# Patient Record
Sex: FEMALE | Employment: UNEMPLOYED | URBAN - METROPOLITAN AREA
[De-identification: names, ages, dates, MRNs, and addresses within clinical notes are randomized per-mention and may not be internally consistent; named-entity substitution may affect disease eponyms.]

---
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
## 2021-01-08 DIAGNOSIS — N91.2 AMENORRHEA: Primary | ICD-10-CM

## 2021-01-26 ENCOUNTER — HOSPITAL ENCOUNTER (OUTPATIENT)
Dept: ULTRASOUND IMAGING | Facility: CLINIC | Age: 21
Discharge: HOME | End: 2021-01-26
Attending: ADVANCED PRACTICE MIDWIFE | Admitting: ADVANCED PRACTICE MIDWIFE
Payer: MEDICAID

## 2021-01-26 DIAGNOSIS — N91.2 AMENORRHEA: ICD-10-CM

## 2021-01-26 PROCEDURE — 76801 OB US < 14 WKS SINGLE FETUS: CPT | Mod: 26 | Performed by: RADIOLOGY

## 2021-01-26 PROCEDURE — 76801 OB US < 14 WKS SINGLE FETUS: CPT

## 2021-03-19 DIAGNOSIS — Z36.89 ENCOUNTER FOR FETAL ANATOMIC SURVEY: Primary | ICD-10-CM

## 2021-03-22 ENCOUNTER — TRANSCRIBE ORDERS (OUTPATIENT)
Dept: MATERNAL FETAL MEDICINE | Facility: CLINIC | Age: 21
End: 2021-03-22

## 2021-03-22 DIAGNOSIS — O26.90 PREGNANCY RELATED CONDITION, ANTEPARTUM: Primary | ICD-10-CM

## 2021-04-14 ENCOUNTER — PRE VISIT (OUTPATIENT)
Dept: MATERNAL FETAL MEDICINE | Facility: CLINIC | Age: 21
End: 2021-04-14

## 2021-04-15 ENCOUNTER — OFFICE VISIT (OUTPATIENT)
Dept: MATERNAL FETAL MEDICINE | Facility: CLINIC | Age: 21
End: 2021-04-15
Attending: ADVANCED PRACTICE MIDWIFE
Payer: COMMERCIAL

## 2021-04-15 ENCOUNTER — HOSPITAL ENCOUNTER (OUTPATIENT)
Dept: ULTRASOUND IMAGING | Facility: CLINIC | Age: 21
End: 2021-04-15
Attending: ADVANCED PRACTICE MIDWIFE
Payer: COMMERCIAL

## 2021-04-15 DIAGNOSIS — O35.9XX0 SUSPECTED FETAL ANOMALY, ANTEPARTUM, SINGLE OR UNSPECIFIED FETUS: Primary | ICD-10-CM

## 2021-04-15 DIAGNOSIS — O26.90 PREGNANCY RELATED CONDITION, ANTEPARTUM: ICD-10-CM

## 2021-04-15 PROCEDURE — 76805 OB US >/= 14 WKS SNGL FETUS: CPT

## 2021-04-15 PROCEDURE — 76805 OB US >/= 14 WKS SNGL FETUS: CPT | Mod: 26 | Performed by: OBSTETRICS & GYNECOLOGY

## 2021-04-15 NOTE — PROGRESS NOTES
Please see ultrasound report under Imaging tab for details of today's ultrasound.    Anitra Bernal MD  Maternal-Fetal Medicine

## 2021-05-13 ENCOUNTER — HOSPITAL ENCOUNTER (OUTPATIENT)
Dept: ULTRASOUND IMAGING | Facility: CLINIC | Age: 21
End: 2021-05-13
Attending: OBSTETRICS & GYNECOLOGY
Payer: COMMERCIAL

## 2021-05-13 ENCOUNTER — OFFICE VISIT (OUTPATIENT)
Dept: MATERNAL FETAL MEDICINE | Facility: CLINIC | Age: 21
End: 2021-05-13
Attending: OBSTETRICS & GYNECOLOGY
Payer: COMMERCIAL

## 2021-05-13 DIAGNOSIS — O35.9XX0 SUSPECTED FETAL ANOMALY, ANTEPARTUM, SINGLE OR UNSPECIFIED FETUS: ICD-10-CM

## 2021-05-13 PROCEDURE — 76816 OB US FOLLOW-UP PER FETUS: CPT | Mod: 26 | Performed by: OBSTETRICS & GYNECOLOGY

## 2021-05-13 PROCEDURE — 76816 OB US FOLLOW-UP PER FETUS: CPT

## 2021-05-13 NOTE — PROGRESS NOTES
"Please see \"Imaging\" tab under \"Chart Review\" for details of today's US at the [facility].    Kristofer Cervantes MD  Maternal-Fetal Medicine      "

## 2021-06-19 ENCOUNTER — HEALTH MAINTENANCE LETTER (OUTPATIENT)
Age: 21
End: 2021-06-19

## 2021-08-14 ENCOUNTER — TELEPHONE (OUTPATIENT)
Dept: OBGYN | Facility: CLINIC | Age: 21
End: 2021-08-14

## 2021-08-15 NOTE — TELEPHONE ENCOUNTER
at 36w0d feeling increased fetal movement. No contractions, but has had some cramps. No vaginal bleeding, no leaking of fluid, no fever, no cough, +nauseated over last few days, no vomiting, no diarrhea.     No warning sign symptoms at this time. Reviewed signs of labor and to call if fetal movement decreases. Questions answered.    Brigitte Sethi MD, MSCI    [facility]/OBGYN

## 2021-08-18 ENCOUNTER — LAB REQUISITION (OUTPATIENT)
Dept: LAB | Facility: CLINIC | Age: 21
End: 2021-08-18
Payer: COMMERCIAL

## 2021-08-18 DIAGNOSIS — O09.90 SUPERVISION OF HIGH RISK PREGNANCY, UNSPECIFIED, UNSPECIFIED TRIMESTER: ICD-10-CM

## 2021-08-18 PROCEDURE — 87653 STREP B DNA AMP PROBE: CPT | Mod: ORL | Performed by: ADVANCED PRACTICE MIDWIFE

## 2021-08-18 PROCEDURE — 87491 CHLMYD TRACH DNA AMP PROBE: CPT | Mod: ORL | Performed by: ADVANCED PRACTICE MIDWIFE

## 2021-08-18 PROCEDURE — 87591 N.GONORRHOEAE DNA AMP PROB: CPT | Mod: ORL | Performed by: ADVANCED PRACTICE MIDWIFE

## 2021-08-19 LAB
C TRACH DNA SPEC QL NAA+PROBE: NEGATIVE
GP B STREP DNA SPEC QL NAA+PROBE: NEGATIVE
N GONORRHOEA DNA SPEC QL NAA+PROBE: NEGATIVE
PATIENT PENICILLIN, AMOXICILLIN, CEPHALOSPORINS ALLERGY: NORMAL

## 2021-08-28 ENCOUNTER — HOSPITAL ENCOUNTER (OUTPATIENT)
Facility: CLINIC | Age: 21
End: 2021-08-28
Attending: ADVANCED PRACTICE MIDWIFE | Admitting: ADVANCED PRACTICE MIDWIFE
Payer: COMMERCIAL

## 2021-08-28 ENCOUNTER — HOSPITAL ENCOUNTER (OUTPATIENT)
Facility: CLINIC | Age: 21
Discharge: HOME | End: 2021-08-28
Attending: REGISTERED NURSE | Admitting: REGISTERED NURSE
Payer: COMMERCIAL

## 2021-08-28 VITALS — TEMPERATURE: 98.8 F | DIASTOLIC BLOOD PRESSURE: 74 MMHG | SYSTOLIC BLOOD PRESSURE: 122 MMHG | RESPIRATION RATE: 18 BRPM

## 2021-08-28 PROBLEM — Z71.89 COORDINATION OF COMPLEX CARE: Status: ACTIVE | Noted: 2021-03-09

## 2021-08-28 PROBLEM — Z36.89 ENCOUNTER FOR TRIAGE IN PREGNANT PATIENT: Status: ACTIVE | Noted: 2021-08-28

## 2021-08-28 PROBLEM — Z78.9 NOT IMMUNE TO HEPATITIS B VIRUS: Status: ACTIVE | Noted: 2021-07-12

## 2021-08-28 PROBLEM — Z86.59 HISTORY OF RECENT STRESSFUL LIFE EVENT: Status: ACTIVE | Noted: 2017-08-14

## 2021-08-28 PROBLEM — G44.52 NEW DAILY PERSISTENT HEADACHE: Status: ACTIVE | Noted: 2019-07-18

## 2021-08-28 PROBLEM — O09.90 SUPERVISION OF HIGH RISK PREGNANCY, ANTEPARTUM: Status: ACTIVE | Noted: 2021-03-19

## 2021-08-28 LAB
ALT SERPL W P-5'-P-CCNC: 13 U/L (ref 0–50)
AST SERPL W P-5'-P-CCNC: 15 U/L (ref 0–45)
CREAT SERPL-MCNC: 0.79 MG/DL (ref 0.52–1.04)
CREAT UR-MCNC: 119 MG/DL
ERYTHROCYTE [DISTWIDTH] IN BLOOD BY AUTOMATED COUNT: 13.2 % (ref 10–15)
GFR SERPL CREATININE-BSD FRML MDRD: >90 ML/MIN/1.73M2
HCT VFR BLD AUTO: 31.2 % (ref 35–47)
HGB BLD-MCNC: 10.2 G/DL (ref 11.7–15.7)
MCH RBC QN AUTO: 29.1 PG (ref 26.5–33)
MCHC RBC AUTO-ENTMCNC: 32.7 G/DL (ref 31.5–36.5)
MCV RBC AUTO: 89 FL (ref 78–100)
PLATELET # BLD AUTO: 224 10E3/UL (ref 150–450)
PROT UR-MCNC: 0.18 G/L
PROT/CREAT 24H UR: 0.15 G/G CR (ref 0–0.2)
RBC # BLD AUTO: 3.5 10E6/UL (ref 3.8–5.2)
URATE SERPL-MCNC: 4.6 MG/DL (ref 2.6–6)
WBC # BLD AUTO: 12.1 10E3/UL (ref 4–11)

## 2021-08-28 PROCEDURE — 84550 ASSAY OF BLOOD/URIC ACID: CPT | Performed by: ADVANCED PRACTICE MIDWIFE

## 2021-08-28 PROCEDURE — 999N000104 HC STATISTIC NO CHARGE: Performed by: EMERGENCY MEDICINE

## 2021-08-28 PROCEDURE — 85460 HEMOGLOBIN FETAL: CPT | Performed by: ADVANCED PRACTICE MIDWIFE

## 2021-08-28 PROCEDURE — G0463 HOSPITAL OUTPT CLINIC VISIT: HCPCS | Mod: 25

## 2021-08-28 PROCEDURE — 36415 COLL VENOUS BLD VENIPUNCTURE: CPT | Performed by: ADVANCED PRACTICE MIDWIFE

## 2021-08-28 PROCEDURE — 84460 ALANINE AMINO (ALT) (SGPT): CPT | Performed by: ADVANCED PRACTICE MIDWIFE

## 2021-08-28 PROCEDURE — 84156 ASSAY OF PROTEIN URINE: CPT | Performed by: ADVANCED PRACTICE MIDWIFE

## 2021-08-28 PROCEDURE — 59025 FETAL NON-STRESS TEST: CPT

## 2021-08-28 PROCEDURE — 82565 ASSAY OF CREATININE: CPT | Performed by: ADVANCED PRACTICE MIDWIFE

## 2021-08-28 PROCEDURE — 84450 TRANSFERASE (AST) (SGOT): CPT | Performed by: ADVANCED PRACTICE MIDWIFE

## 2021-08-28 PROCEDURE — 85027 COMPLETE CBC AUTOMATED: CPT | Performed by: ADVANCED PRACTICE MIDWIFE

## 2021-08-28 PROCEDURE — 250N000013 HC RX MED GY IP 250 OP 250 PS 637: Performed by: REGISTERED NURSE

## 2021-08-28 PROCEDURE — 59025 FETAL NON-STRESS TEST: CPT | Mod: 26 | Performed by: REGISTERED NURSE

## 2021-08-28 PROCEDURE — 99214 OFFICE O/P EST MOD 30 MIN: CPT | Mod: 25 | Performed by: REGISTERED NURSE

## 2021-08-28 RX ORDER — PROCHLORPERAZINE MALEATE 10 MG
10 TABLET ORAL EVERY 6 HOURS PRN
Status: DISCONTINUED | OUTPATIENT
Start: 2021-08-28 | End: 2021-08-29 | Stop reason: HOSPADM

## 2021-08-28 RX ORDER — PRENATAL VIT/IRON FUM/FOLIC AC 27MG-0.8MG
1 TABLET ORAL DAILY
COMMUNITY

## 2021-08-28 RX ORDER — MULTIVIT WITH MINERALS/LUTEIN
TABLET ORAL DAILY
Status: ON HOLD | COMMUNITY
End: 2021-09-07

## 2021-08-28 RX ORDER — METOCLOPRAMIDE HYDROCHLORIDE 5 MG/ML
10 INJECTION INTRAMUSCULAR; INTRAVENOUS EVERY 6 HOURS PRN
Status: DISCONTINUED | OUTPATIENT
Start: 2021-08-28 | End: 2021-08-29 | Stop reason: HOSPADM

## 2021-08-28 RX ORDER — ONDANSETRON 2 MG/ML
4 INJECTION INTRAMUSCULAR; INTRAVENOUS EVERY 6 HOURS PRN
Status: DISCONTINUED | OUTPATIENT
Start: 2021-08-28 | End: 2021-08-29 | Stop reason: HOSPADM

## 2021-08-28 RX ORDER — METOCLOPRAMIDE 10 MG/1
10 TABLET ORAL EVERY 6 HOURS PRN
Status: DISCONTINUED | OUTPATIENT
Start: 2021-08-28 | End: 2021-08-29 | Stop reason: HOSPADM

## 2021-08-28 RX ORDER — ASPIRIN 81 MG/1
81 TABLET, CHEWABLE ORAL DAILY
Status: ON HOLD | COMMUNITY
End: 2021-09-07

## 2021-08-28 RX ORDER — ONDANSETRON 4 MG/1
4 TABLET, ORALLY DISINTEGRATING ORAL EVERY 6 HOURS PRN
Status: DISCONTINUED | OUTPATIENT
Start: 2021-08-28 | End: 2021-08-29 | Stop reason: HOSPADM

## 2021-08-28 RX ORDER — ACETAMINOPHEN 325 MG/1
975 TABLET ORAL EVERY 6 HOURS PRN
Status: DISCONTINUED | OUTPATIENT
Start: 2021-08-28 | End: 2021-08-29 | Stop reason: HOSPADM

## 2021-08-28 RX ORDER — VITAMIN B COMPLEX
1 TABLET ORAL DAILY
Status: ON HOLD | COMMUNITY
End: 2021-09-07

## 2021-08-28 RX ORDER — PROCHLORPERAZINE 25 MG
25 SUPPOSITORY, RECTAL RECTAL EVERY 12 HOURS PRN
Status: DISCONTINUED | OUTPATIENT
Start: 2021-08-28 | End: 2021-08-29 | Stop reason: HOSPADM

## 2021-08-28 RX ADMIN — ACETAMINOPHEN 975 MG: 325 TABLET, FILM COATED ORAL at 22:25

## 2021-08-29 LAB
FETAL RBC % LFV: 0 %
FETAL RBC (ML): 0 ML
IF INDICATED RECOMMENDED DOSE OF RH IMMUNE GLOBULIN UG: 300 UG

## 2021-08-29 NOTE — ED NOTES
Pt arrived to ED with complaint of fall due to tripping, here for baby monitoring .  Pt reports 38 weeks pregnant.   Pt is having contractions No.   Pt feels urge to push No.   Pt reports water broke No.   Report was called and pt was transferred to L&D Yes.

## 2021-08-29 NOTE — DISCHARGE INSTRUCTIONS
Discharge Instruction for Undelivered Patients      You were seen for: Fetal Assessment and fall, Pre-e eval  We Consulted: Bonita Bhatti CNM  You had (Test or Medicine):EFM, Pre-e labs, KB      Diet:   Drink 8 to 12 glasses of liquids (milk, juice, water) every day.  You may eat meals and snacks.     Activity:  Count fetal kicks everyday (see handout)  Call your doctor or nurse midwife if your baby is moving less than usual.     Call your provider if you notice:  Swelling in your face or increased swelling in your hands or legs.  Headaches that are not relieved by Tylenol (acetaminophen).  Changes in your vision (blurring: seeing spots or stars.)  Nausea (sick to your stomach) and vomiting (throwing up).   Weight gain of 5 pounds or more per week.  Heartburn that doesn't go away.  Signs of bladder infection: pain when you urinate (use the toilet), need to go more often and more urgently.  The bag of lerma (rupture of membranes) breaks, or you notice leaking in your underwear.  Bright red blood in your underwear.  Abdominal (lower belly) or stomach pain.  For first baby: Contractions (tightening) less than 5 minutes apart for one hour or more.  Increase or change in vaginal discharge (note the color and amount)      Follow-up:  As scheduled in the clinic

## 2021-08-29 NOTE — PLAN OF CARE
Data: [facility] at 2120.   Reason for maternal/fetal assessment per patient is Headache (fell at 1900, headache 3 days with eye pain)  . Patient is a . Prenatal record reviewed.      OB History    Para Term  AB Living   2 0 0 0 1 0   SAB TAB Ectopic Multiple Live Births   1 0 0 0 0      # Outcome Date GA Lbr Hollis/2nd Weight Sex Delivery Anes PTL Lv   2 Current            1 SAB               Medical History:   Past Medical History:   Diagnosis Date     UTI (urinary tract infection)    . Gestational Age 38w0d. VSS. Cervix: not examined.  Fetal movement present. Patient denies cramping, backache, vaginal discharge, pelvic pressure, UTI symptoms, GI problems, bloody show, vaginal bleeding, edema, epigastric or URQ pain, abdominal pain, rupture of membranes. Admits headache, burning/pressure around eyes, and fall to right knee at 1900. Support person Abraham, present.  Action: Verbal consent for EFM. Triage assessment completed. EFM  And uterine assessment completed. Fetal assessment: Presumed adequate fetal oxygenation documented (see flow record). Patient education pamphlets given on fetal movement counts. Patient instructed to report change in fetal movement, vaginal leaking of fluid or bleeding, abdominal pain, or any concerns related to the pregnancy to her nurse/physician.   Response: Bonita Mckeon CNM informed of patient arrival, headache and fall. Plan per provider is run Pre-e labs, KB, check multiple BPs, give Tylenol and discharge home following reassuring BPs and fetal assessment. Provider will contact patient if labs abnormal.  Patient verbalized understanding of education and verbalized agreement with plan. Discharged ambulatory at 2318.

## 2021-08-29 NOTE — PROGRESS NOTES
"HOSPITAL TRIAGE NOTE  ===================    CHIEF COMPLAINT  ========================  Catalina Hampton is a 21 year old patient presenting today at 38w0d for evaluation of decreased fetal movement, fall/trauma, HA x3 hours.    Patient's last menstrual period was 2020.  Estimated Date of Delivery: Sep 11, 2021       HPI  ==================   Patient reports HA that started 3 days ago with no blurry vision. She feels the headache is \"in the front of [her] head and behind [her] eyes\". She also reports she had an episode of dizziness that caused her to fall onto her knees but doesn't remember hitting her abdomen. This was at 1900 tonight. Denies vaginal bleeding or LOF. Reports felt decreased fetal movement since earlier today but has felt movement since arrival to triage.    Denies fever, cough, SOB or chest pain. Denies having contact with anyone who is Covid-19 positive.   Prenatal record and labs reviewed from [facility], through [facility]MR and Care Everywhere.    CONTRACTIONS: not felt by patient  ABDOMINAL PAIN: none  FETAL MOVEMENT: decreased since today    VAGINAL BLEEDING: none  RUPTURE OF MEMBRANES: no  PELVIC PAIN: none    PREGNANCY COMPLICATIONS: Hep B nonimmune  OTHER: history of childhood trauma    # Pain Assessment:  Current Pain Score 2018   Pain score (0-10) 0   Catalina giraldo pain level was assessed and she currently denies pain.        REVIEW OF SYSTEMS  =====================  C: NEGATIVE for fever, chills  I: NEGATIVE for worrisome rashes, moles or lesions  E: NEGATIVE for vision changes or irritation  R: NEGATIVE for significant cough or SOB  CV: NEGATIVE for chest pain, palpitations or varicosities  GI: NEGATIVE for nausea, abdominal pain, heartburn, or change in bowel habits  : NEGATIVE for frequency, dysuria, or hematuria  M: NEGATIVE for significant arthralgias or myalgia  N: NEGATIVE for headache, weakness, dizziness or paresthesias  P: NEGATIVE for changes in mood or " affect    PROBLEM LIST  ===============  Patient Active Problem List    Diagnosis Date Noted     Encounter for triage in pregnant patient 2021     Priority: Medium       HISTORIES  ==============  ALLERGIES:    No Known Allergies  PAST MEDICAL HISTORY  Past Medical History:   Diagnosis Date     UTI (urinary tract infection)      SOCIAL HISTORY  Social History     Socioeconomic History     Marital status: Single     Spouse name: Not on file     Number of children: Not on file     Years of education: Not on file     Highest education level: Not on file   Occupational History     Not on file   Tobacco Use     Smoking status: Never Smoker     Smokeless tobacco: Never Used   Substance and Sexual Activity     Alcohol use: No     Drug use: No     Sexual activity: Not on file   Other Topics Concern     Not on file   Social History Narrative     Not on file     Social Determinants of Health     Financial Resource Strain:      Difficulty of Paying Living Expenses:    Food Insecurity:      Worried About Running Out of Food in the Last Year:      Ran Out of Food in the Last Year:    Transportation Needs:      Lack of Transportation (Medical):      Lack of Transportation (Non-Medical):    Physical Activity:      Days of Exercise per Week:      Minutes of Exercise per Session:    Stress:      Feeling of Stress :    Social Connections:      Frequency of Communication with Friends and Family:      Frequency of Social Gatherings with Friends and Family:      Attends Judaism Services:      Active Member of Clubs or Organizations:      Attends Club or Organization Meetings:      Marital Status:    Intimate Partner Violence:      Fear of Current or Ex-Partner:      Emotionally Abused:      Physically Abused:      Sexually Abused:      PARTNER: Abraham  FAMILY HISTORY  History reviewed. No pertinent family history.  OB HISTORY  OB History    Para Term  AB Living   2 0 0 0 1 0   SAB TAB Ectopic Multiple Live Births    1 0 0 0 0      # Outcome Date GA Lbr Hollis/2nd Weight Sex Delivery Anes PTL Lv   2 Current            1 SAB              Prenatal Labs:   Lab Results   Component Value Date    HGB 12.6 09/02/2017     Rubella- immune  GBS negative 8/18/21  GCT- passed 6/23/21 2/19/21  Hep B surface antigen- neg  HIV- neg  Hep B nonimmune 3rd dose due 10/13/21      8/18/21  GC/CT negative   Plts 223  Hgb 10.8    ULTRASOUND(s) reviewed: none    EXAM  ============  LMP 12/05/2020   GENERAL APPEARANCE: healthy, alert and no distress  RESP: lungs clear to auscultation - no rales, rhonchi or wheezes  CV: regular rates and rhythm, normal S1 S2, no S3 or S4 and no murmur,and no varicosities  ABDOMEN:  soft, nontender, no epigastric pain  SKIN: no suspicious lesions or rashes  NEURO: Denies headache, blurred vision, other vision changes  PSYCH: mentation appears normal. and affect normal/bright  MS/ LEGS: Reflexes normal bilaterally, No clonus bilaterally, 2+ edema and Negative Becky's     CONTRACTIONS: every irregular minutes and not felt by patient   FETAL HEART TONES: continuous EFM- baseline 150 with moderate variability and positive accelerations. No decelerations.  NST: REACTIVE  EFW:6.5lbs    PELVIC EXAM: deferred  TIJERINA SCORE: n/a  PRESENTATION: VERTEX via BSUS  BLOOD: no  DISCHARGE: none    ROM: no  ROMPLUS: not done    LABS: HELLP labs- ALT, AST, creatinine, uric acid, CBC with platelets, Protein/Creatinine Ratio, and KB  Lab results reviewed- TPCr 0.18    DIAGNOSIS  ============  38w0d seen on the [facility] Triage for decreased fetal movement, HA x3 days and for evaluation after fall/trauma     NST: REACTIVE  Fetal Heart Tones:category one  Patient Active Problem List   Diagnosis     Encounter for triage in pregnant patient       PLAN  ============  -Continuous EFM until 2300 which is 4 hours post-fall.   -Discussed comfort measures for HA, encouraged PRN tylenol. Took 975mg here in triage.   -Encouraged increasing water  intake.   Discharge to home with labor instuctions per discharge instruction form  Call or return to the [facility] with contractions, cramping, abdominal or pelvic pain, vaginal bleeding, leaking fluid or decreased fetal movement.  Follow up at your next clinic visit- 9/1/21 or sooner prn    NAOMI Chavarria CNM

## 2021-09-07 ENCOUNTER — ANESTHESIA (OUTPATIENT)
Dept: OBGYN | Facility: CLINIC | Age: 21
End: 2021-09-07
Payer: COMMERCIAL

## 2021-09-07 ENCOUNTER — HOSPITAL ENCOUNTER (INPATIENT)
Facility: CLINIC | Age: 21
LOS: 2 days | Discharge: HOME HEALTH | End: 2021-09-09
Attending: ADVANCED PRACTICE MIDWIFE | Admitting: ADVANCED PRACTICE MIDWIFE
Payer: COMMERCIAL

## 2021-09-07 ENCOUNTER — ANESTHESIA EVENT (OUTPATIENT)
Dept: OBGYN | Facility: CLINIC | Age: 21
End: 2021-09-07
Payer: COMMERCIAL

## 2021-09-07 DIAGNOSIS — F32.A DEPRESSION, UNSPECIFIED DEPRESSION TYPE: ICD-10-CM

## 2021-09-07 LAB
ABO/RH(D): NORMAL
ALT SERPL W P-5'-P-CCNC: 10 U/L (ref 0–50)
ANTIBODY SCREEN: NEGATIVE
AST SERPL W P-5'-P-CCNC: 19 U/L (ref 0–45)
BASOPHILS # BLD AUTO: 0.1 10E3/UL (ref 0–0.2)
BASOPHILS NFR BLD AUTO: 0 %
CREAT SERPL-MCNC: 0.66 MG/DL (ref 0.52–1.04)
CREAT UR-MCNC: 63 MG/DL
EOSINOPHIL # BLD AUTO: 0.1 10E3/UL (ref 0–0.7)
EOSINOPHIL NFR BLD AUTO: 0 %
ERYTHROCYTE [DISTWIDTH] IN BLOOD BY AUTOMATED COUNT: 13.4 % (ref 10–15)
ERYTHROCYTE [DISTWIDTH] IN BLOOD BY AUTOMATED COUNT: 13.5 % (ref 10–15)
GFR SERPL CREATININE-BSD FRML MDRD: >90 ML/MIN/1.73M2
HCT VFR BLD AUTO: 28.5 % (ref 35–47)
HCT VFR BLD AUTO: 32.8 % (ref 35–47)
HGB BLD-MCNC: 10.5 G/DL (ref 11.7–15.7)
HGB BLD-MCNC: 9.5 G/DL (ref 11.7–15.7)
IMM GRANULOCYTES # BLD: 0.1 10E3/UL
IMM GRANULOCYTES NFR BLD: 1 %
LYMPHOCYTES # BLD AUTO: 2 10E3/UL (ref 0.8–5.3)
LYMPHOCYTES NFR BLD AUTO: 16 %
MCH RBC QN AUTO: 28.6 PG (ref 26.5–33)
MCH RBC QN AUTO: 29.2 PG (ref 26.5–33)
MCHC RBC AUTO-ENTMCNC: 32 G/DL (ref 31.5–36.5)
MCHC RBC AUTO-ENTMCNC: 33.3 G/DL (ref 31.5–36.5)
MCV RBC AUTO: 88 FL (ref 78–100)
MCV RBC AUTO: 89 FL (ref 78–100)
MONOCYTES # BLD AUTO: 0.7 10E3/UL (ref 0–1.3)
MONOCYTES NFR BLD AUTO: 6 %
NEUTROPHILS # BLD AUTO: 9.5 10E3/UL (ref 1.6–8.3)
NEUTROPHILS NFR BLD AUTO: 77 %
NRBC # BLD AUTO: 0 10E3/UL
NRBC BLD AUTO-RTO: 0 /100
PLATELET # BLD AUTO: 197 10E3/UL (ref 150–450)
PLATELET # BLD AUTO: 209 10E3/UL (ref 150–450)
PROT UR-MCNC: 0.1 G/L
PROT/CREAT 24H UR: 0.16 G/G CR (ref 0–0.2)
RBC # BLD AUTO: 3.25 10E6/UL (ref 3.8–5.2)
RBC # BLD AUTO: 3.67 10E6/UL (ref 3.8–5.2)
RUPTURE OF FETAL MEMBRANES BY ROM PLUS: POSITIVE
SARS-COV-2 RNA RESP QL NAA+PROBE: NEGATIVE
SPECIMEN EXPIRATION DATE: NORMAL
T PALLIDUM AB SER QL: NONREACTIVE
URATE SERPL-MCNC: 4.1 MG/DL (ref 2.6–6)
WBC # BLD AUTO: 12.5 10E3/UL (ref 4–11)
WBC # BLD AUTO: 16.5 10E3/UL (ref 4–11)

## 2021-09-07 PROCEDURE — 84156 ASSAY OF PROTEIN URINE: CPT | Performed by: ADVANCED PRACTICE MIDWIFE

## 2021-09-07 PROCEDURE — 84112 EVAL AMNIOTIC FLUID PROTEIN: CPT | Performed by: ADVANCED PRACTICE MIDWIFE

## 2021-09-07 PROCEDURE — 84550 ASSAY OF BLOOD/URIC ACID: CPT | Performed by: ADVANCED PRACTICE MIDWIFE

## 2021-09-07 PROCEDURE — 85025 COMPLETE CBC W/AUTO DIFF WBC: CPT | Performed by: ADVANCED PRACTICE MIDWIFE

## 2021-09-07 PROCEDURE — 00HU33Z INSERTION OF INFUSION DEVICE INTO SPINAL CANAL, PERCUTANEOUS APPROACH: ICD-10-PCS | Performed by: ANESTHESIOLOGY

## 2021-09-07 PROCEDURE — 82565 ASSAY OF CREATININE: CPT | Performed by: ADVANCED PRACTICE MIDWIFE

## 2021-09-07 PROCEDURE — 84450 TRANSFERASE (AST) (SGOT): CPT | Performed by: ADVANCED PRACTICE MIDWIFE

## 2021-09-07 PROCEDURE — 250N000013 HC RX MED GY IP 250 OP 250 PS 637: Performed by: ADVANCED PRACTICE MIDWIFE

## 2021-09-07 PROCEDURE — 84460 ALANINE AMINO (ALT) (SGPT): CPT | Performed by: ADVANCED PRACTICE MIDWIFE

## 2021-09-07 PROCEDURE — 250N000011 HC RX IP 250 OP 636: Performed by: ADVANCED PRACTICE MIDWIFE

## 2021-09-07 PROCEDURE — 0KQM0ZZ REPAIR PERINEUM MUSCLE, OPEN APPROACH: ICD-10-PCS | Performed by: ADVANCED PRACTICE MIDWIFE

## 2021-09-07 PROCEDURE — 86780 TREPONEMA PALLIDUM: CPT | Performed by: ADVANCED PRACTICE MIDWIFE

## 2021-09-07 PROCEDURE — 258N000003 HC RX IP 258 OP 636: Performed by: ADVANCED PRACTICE MIDWIFE

## 2021-09-07 PROCEDURE — 250N000009 HC RX 250: Performed by: ADVANCED PRACTICE MIDWIFE

## 2021-09-07 PROCEDURE — 0UJD7ZZ INSPECTION OF UTERUS AND CERVIX, VIA NATURAL OR ARTIFICIAL OPENING: ICD-10-PCS | Performed by: ADVANCED PRACTICE MIDWIFE

## 2021-09-07 PROCEDURE — 250N000011 HC RX IP 250 OP 636

## 2021-09-07 PROCEDURE — 3E0R3BZ INTRODUCTION OF ANESTHETIC AGENT INTO SPINAL CANAL, PERCUTANEOUS APPROACH: ICD-10-PCS | Performed by: ANESTHESIOLOGY

## 2021-09-07 PROCEDURE — 86901 BLOOD TYPING SEROLOGIC RH(D): CPT | Performed by: ADVANCED PRACTICE MIDWIFE

## 2021-09-07 PROCEDURE — 59409 OBSTETRICAL CARE: CPT | Performed by: ADVANCED PRACTICE MIDWIFE

## 2021-09-07 PROCEDURE — 722N000001 HC LABOR CARE VAGINAL DELIVERY SINGLE

## 2021-09-07 PROCEDURE — 258N000003 HC RX IP 258 OP 636

## 2021-09-07 PROCEDURE — 85027 COMPLETE CBC AUTOMATED: CPT | Performed by: ADVANCED PRACTICE MIDWIFE

## 2021-09-07 PROCEDURE — G0463 HOSPITAL OUTPT CLINIC VISIT: HCPCS

## 2021-09-07 PROCEDURE — 36415 COLL VENOUS BLD VENIPUNCTURE: CPT | Performed by: ADVANCED PRACTICE MIDWIFE

## 2021-09-07 PROCEDURE — 120N000002 HC R&B MED SURG/OB UMMC

## 2021-09-07 PROCEDURE — 370N000003 HC ANESTHESIA WARD SERVICE

## 2021-09-07 PROCEDURE — U0003 INFECTIOUS AGENT DETECTION BY NUCLEIC ACID (DNA OR RNA); SEVERE ACUTE RESPIRATORY SYNDROME CORONAVIRUS 2 (SARS-COV-2) (CORONAVIRUS DISEASE [COVID-19]), AMPLIFIED PROBE TECHNIQUE, MAKING USE OF HIGH THROUGHPUT TECHNOLOGIES AS DESCRIBED BY CMS-2020-01-R: HCPCS | Performed by: ADVANCED PRACTICE MIDWIFE

## 2021-09-07 RX ORDER — CARBOPROST TROMETHAMINE 250 UG/ML
250 INJECTION, SOLUTION INTRAMUSCULAR
Status: DISCONTINUED | OUTPATIENT
Start: 2021-09-07 | End: 2021-09-07 | Stop reason: HOSPADM

## 2021-09-07 RX ORDER — IBUPROFEN 600 MG/1
600 TABLET, FILM COATED ORAL EVERY 6 HOURS PRN
Qty: 60 TABLET | Refills: 0 | Status: SHIPPED | OUTPATIENT
Start: 2021-09-07

## 2021-09-07 RX ORDER — MISOPROSTOL 200 UG/1
800 TABLET ORAL
Status: DISCONTINUED | OUTPATIENT
Start: 2021-09-07 | End: 2021-09-07 | Stop reason: HOSPADM

## 2021-09-07 RX ORDER — MISOPROSTOL 200 UG/1
400 TABLET ORAL
Status: DISCONTINUED | OUTPATIENT
Start: 2021-09-07 | End: 2021-09-07 | Stop reason: HOSPADM

## 2021-09-07 RX ORDER — SERTRALINE HYDROCHLORIDE 25 MG/1
25 TABLET, FILM COATED ORAL DAILY
Qty: 60 TABLET | Refills: 1 | Status: SHIPPED | OUTPATIENT
Start: 2021-09-07 | End: 2021-09-07

## 2021-09-07 RX ORDER — NALBUPHINE HYDROCHLORIDE 10 MG/ML
2.5-5 INJECTION, SOLUTION INTRAMUSCULAR; INTRAVENOUS; SUBCUTANEOUS EVERY 6 HOURS PRN
Status: DISCONTINUED | OUTPATIENT
Start: 2021-09-07 | End: 2021-09-09 | Stop reason: HOSPADM

## 2021-09-07 RX ORDER — KETOROLAC TROMETHAMINE 30 MG/ML
30 INJECTION, SOLUTION INTRAMUSCULAR; INTRAVENOUS
Status: DISCONTINUED | OUTPATIENT
Start: 2021-09-07 | End: 2021-09-09 | Stop reason: HOSPADM

## 2021-09-07 RX ORDER — TRANEXAMIC ACID 10 MG/ML
1 INJECTION, SOLUTION INTRAVENOUS EVERY 30 MIN PRN
Status: DISCONTINUED | OUTPATIENT
Start: 2021-09-07 | End: 2021-09-09 | Stop reason: HOSPADM

## 2021-09-07 RX ORDER — SODIUM CHLORIDE, SODIUM LACTATE, POTASSIUM CHLORIDE, CALCIUM CHLORIDE 600; 310; 30; 20 MG/100ML; MG/100ML; MG/100ML; MG/100ML
INJECTION, SOLUTION INTRAVENOUS CONTINUOUS
Status: DISCONTINUED | OUTPATIENT
Start: 2021-09-07 | End: 2021-09-07 | Stop reason: HOSPADM

## 2021-09-07 RX ORDER — NALOXONE HYDROCHLORIDE 0.4 MG/ML
0.4 INJECTION, SOLUTION INTRAMUSCULAR; INTRAVENOUS; SUBCUTANEOUS
Status: DISCONTINUED | OUTPATIENT
Start: 2021-09-07 | End: 2021-09-07 | Stop reason: HOSPADM

## 2021-09-07 RX ORDER — EPHEDRINE SULFATE 50 MG/ML
INJECTION, SOLUTION INTRAMUSCULAR; INTRAVENOUS; SUBCUTANEOUS
Status: DISCONTINUED
Start: 2021-09-07 | End: 2021-09-07 | Stop reason: HOSPADM

## 2021-09-07 RX ORDER — BISACODYL 10 MG
10 SUPPOSITORY, RECTAL RECTAL DAILY PRN
Status: DISCONTINUED | OUTPATIENT
Start: 2021-09-07 | End: 2021-09-09 | Stop reason: HOSPADM

## 2021-09-07 RX ORDER — LIDOCAINE 40 MG/G
CREAM TOPICAL
Status: DISCONTINUED | OUTPATIENT
Start: 2021-09-07 | End: 2021-09-07 | Stop reason: HOSPADM

## 2021-09-07 RX ORDER — OXYTOCIN/0.9 % SODIUM CHLORIDE 30/500 ML
1-24 PLASTIC BAG, INJECTION (ML) INTRAVENOUS CONTINUOUS
Status: DISCONTINUED | OUTPATIENT
Start: 2021-09-07 | End: 2021-09-07 | Stop reason: HOSPADM

## 2021-09-07 RX ORDER — PROCHLORPERAZINE MALEATE 10 MG
10 TABLET ORAL EVERY 6 HOURS PRN
Status: DISCONTINUED | OUTPATIENT
Start: 2021-09-07 | End: 2021-09-07 | Stop reason: HOSPADM

## 2021-09-07 RX ORDER — MISOPROSTOL 200 UG/1
800 TABLET ORAL
Status: DISCONTINUED | OUTPATIENT
Start: 2021-09-07 | End: 2021-09-09 | Stop reason: HOSPADM

## 2021-09-07 RX ORDER — OXYTOCIN/0.9 % SODIUM CHLORIDE 30/500 ML
340 PLASTIC BAG, INJECTION (ML) INTRAVENOUS CONTINUOUS PRN
Status: DISCONTINUED | OUTPATIENT
Start: 2021-09-07 | End: 2021-09-09 | Stop reason: HOSPADM

## 2021-09-07 RX ORDER — METOCLOPRAMIDE 10 MG/1
10 TABLET ORAL EVERY 6 HOURS PRN
Status: DISCONTINUED | OUTPATIENT
Start: 2021-09-07 | End: 2021-09-07 | Stop reason: HOSPADM

## 2021-09-07 RX ORDER — ACETAMINOPHEN 325 MG/1
650 TABLET ORAL EVERY 4 HOURS PRN
Status: DISCONTINUED | OUTPATIENT
Start: 2021-09-07 | End: 2021-09-09 | Stop reason: HOSPADM

## 2021-09-07 RX ORDER — MISOPROSTOL 200 UG/1
TABLET ORAL
Status: DISCONTINUED
Start: 2021-09-07 | End: 2021-09-07 | Stop reason: WASHOUT

## 2021-09-07 RX ORDER — OXYTOCIN 10 [USP'U]/ML
10 INJECTION, SOLUTION INTRAMUSCULAR; INTRAVENOUS
Status: DISCONTINUED | OUTPATIENT
Start: 2021-09-07 | End: 2021-09-09 | Stop reason: HOSPADM

## 2021-09-07 RX ORDER — METOCLOPRAMIDE HYDROCHLORIDE 5 MG/ML
10 INJECTION INTRAMUSCULAR; INTRAVENOUS EVERY 6 HOURS PRN
Status: DISCONTINUED | OUTPATIENT
Start: 2021-09-07 | End: 2021-09-07 | Stop reason: HOSPADM

## 2021-09-07 RX ORDER — OXYTOCIN 10 [USP'U]/ML
INJECTION, SOLUTION INTRAMUSCULAR; INTRAVENOUS
Status: DISCONTINUED
Start: 2021-09-07 | End: 2021-09-07 | Stop reason: WASHOUT

## 2021-09-07 RX ORDER — ONDANSETRON 4 MG/1
4 TABLET, ORALLY DISINTEGRATING ORAL EVERY 6 HOURS PRN
Status: DISCONTINUED | OUTPATIENT
Start: 2021-09-07 | End: 2021-09-07 | Stop reason: HOSPADM

## 2021-09-07 RX ORDER — OXYTOCIN 10 [USP'U]/ML
10 INJECTION, SOLUTION INTRAMUSCULAR; INTRAVENOUS
Status: DISCONTINUED | OUTPATIENT
Start: 2021-09-07 | End: 2021-09-07 | Stop reason: HOSPADM

## 2021-09-07 RX ORDER — NALOXONE HYDROCHLORIDE 0.4 MG/ML
0.2 INJECTION, SOLUTION INTRAMUSCULAR; INTRAVENOUS; SUBCUTANEOUS
Status: DISCONTINUED | OUTPATIENT
Start: 2021-09-07 | End: 2021-09-07 | Stop reason: HOSPADM

## 2021-09-07 RX ORDER — MISOPROSTOL 200 UG/1
400 TABLET ORAL
Status: DISCONTINUED | OUTPATIENT
Start: 2021-09-07 | End: 2021-09-09 | Stop reason: HOSPADM

## 2021-09-07 RX ORDER — OXYTOCIN/0.9 % SODIUM CHLORIDE 30/500 ML
340 PLASTIC BAG, INJECTION (ML) INTRAVENOUS CONTINUOUS PRN
Status: COMPLETED | OUTPATIENT
Start: 2021-09-07 | End: 2021-09-07

## 2021-09-07 RX ORDER — DOCUSATE SODIUM 100 MG/1
100 CAPSULE, LIQUID FILLED ORAL DAILY
Status: DISCONTINUED | OUTPATIENT
Start: 2021-09-08 | End: 2021-09-09 | Stop reason: HOSPADM

## 2021-09-07 RX ORDER — ONDANSETRON 2 MG/ML
4 INJECTION INTRAMUSCULAR; INTRAVENOUS EVERY 6 HOURS PRN
Status: DISCONTINUED | OUTPATIENT
Start: 2021-09-07 | End: 2021-09-07 | Stop reason: HOSPADM

## 2021-09-07 RX ORDER — IBUPROFEN 600 MG/1
600 TABLET, FILM COATED ORAL
Status: DISCONTINUED | OUTPATIENT
Start: 2021-09-07 | End: 2021-09-07

## 2021-09-07 RX ORDER — OXYTOCIN/0.9 % SODIUM CHLORIDE 30/500 ML
100-340 PLASTIC BAG, INJECTION (ML) INTRAVENOUS CONTINUOUS PRN
Status: DISCONTINUED | OUTPATIENT
Start: 2021-09-07 | End: 2021-09-09 | Stop reason: HOSPADM

## 2021-09-07 RX ORDER — ACETAMINOPHEN 325 MG/1
650 TABLET ORAL EVERY 6 HOURS PRN
Qty: 100 TABLET | Refills: 0 | Status: SHIPPED | OUTPATIENT
Start: 2021-09-07

## 2021-09-07 RX ORDER — SERTRALINE HYDROCHLORIDE 25 MG/1
25 TABLET, FILM COATED ORAL DAILY
Status: DISCONTINUED | OUTPATIENT
Start: 2021-09-07 | End: 2021-09-07

## 2021-09-07 RX ORDER — CARBOPROST TROMETHAMINE 250 UG/ML
250 INJECTION, SOLUTION INTRAMUSCULAR
Status: DISCONTINUED | OUTPATIENT
Start: 2021-09-07 | End: 2021-09-09 | Stop reason: HOSPADM

## 2021-09-07 RX ORDER — IBUPROFEN 800 MG/1
800 TABLET, FILM COATED ORAL EVERY 6 HOURS PRN
Status: DISCONTINUED | OUTPATIENT
Start: 2021-09-07 | End: 2021-09-09 | Stop reason: HOSPADM

## 2021-09-07 RX ORDER — TRANEXAMIC ACID 10 MG/ML
1 INJECTION, SOLUTION INTRAVENOUS EVERY 30 MIN PRN
Status: DISCONTINUED | OUTPATIENT
Start: 2021-09-07 | End: 2021-09-07 | Stop reason: HOSPADM

## 2021-09-07 RX ORDER — CEFAZOLIN SODIUM 2 G/100ML
2 INJECTION, SOLUTION INTRAVENOUS ONCE
Status: COMPLETED | OUTPATIENT
Start: 2021-09-07 | End: 2021-09-07

## 2021-09-07 RX ORDER — MODIFIED LANOLIN
OINTMENT (GRAM) TOPICAL
Status: DISCONTINUED | OUTPATIENT
Start: 2021-09-07 | End: 2021-09-09 | Stop reason: HOSPADM

## 2021-09-07 RX ORDER — FENTANYL CITRATE-0.9 % NACL/PF 10 MCG/ML
100 PLASTIC BAG, INJECTION (ML) INTRAVENOUS EVERY 5 MIN PRN
Status: DISCONTINUED | OUTPATIENT
Start: 2021-09-07 | End: 2021-09-07 | Stop reason: HOSPADM

## 2021-09-07 RX ORDER — FENTANYL/BUPIVACAINE/NS/PF 2-1250MCG
PLASTIC BAG, INJECTION (ML) INJECTION
Status: COMPLETED
Start: 2021-09-07 | End: 2021-09-07

## 2021-09-07 RX ORDER — SODIUM CHLORIDE, SODIUM LACTATE, POTASSIUM CHLORIDE, CALCIUM CHLORIDE 600; 310; 30; 20 MG/100ML; MG/100ML; MG/100ML; MG/100ML
INJECTION, SOLUTION INTRAVENOUS
Status: COMPLETED
Start: 2021-09-07 | End: 2021-09-07

## 2021-09-07 RX ORDER — LIDOCAINE HYDROCHLORIDE 10 MG/ML
INJECTION, SOLUTION EPIDURAL; INFILTRATION; INTRACAUDAL; PERINEURAL
Status: DISCONTINUED
Start: 2021-09-07 | End: 2021-09-07 | Stop reason: HOSPADM

## 2021-09-07 RX ORDER — AMOXICILLIN 250 MG
1 CAPSULE ORAL DAILY
Qty: 100 TABLET | Refills: 0 | Status: SHIPPED | OUTPATIENT
Start: 2021-09-07

## 2021-09-07 RX ORDER — FENTANYL CITRATE 50 UG/ML
50-100 INJECTION, SOLUTION INTRAMUSCULAR; INTRAVENOUS
Status: DISCONTINUED | OUTPATIENT
Start: 2021-09-07 | End: 2021-09-07 | Stop reason: HOSPADM

## 2021-09-07 RX ORDER — PROCHLORPERAZINE 25 MG
25 SUPPOSITORY, RECTAL RECTAL EVERY 12 HOURS PRN
Status: DISCONTINUED | OUTPATIENT
Start: 2021-09-07 | End: 2021-09-07 | Stop reason: HOSPADM

## 2021-09-07 RX ORDER — METHYLERGONOVINE MALEATE 0.2 MG/ML
200 INJECTION INTRAVENOUS
Status: DISCONTINUED | OUTPATIENT
Start: 2021-09-07 | End: 2021-09-09 | Stop reason: HOSPADM

## 2021-09-07 RX ORDER — METHYLERGONOVINE MALEATE 0.2 MG/ML
200 INJECTION INTRAVENOUS
Status: DISCONTINUED | OUTPATIENT
Start: 2021-09-07 | End: 2021-09-07 | Stop reason: HOSPADM

## 2021-09-07 RX ORDER — OXYTOCIN/0.9 % SODIUM CHLORIDE 30/500 ML
PLASTIC BAG, INJECTION (ML) INTRAVENOUS
Status: DISCONTINUED
Start: 2021-09-07 | End: 2021-09-07 | Stop reason: HOSPADM

## 2021-09-07 RX ORDER — HYDROCORTISONE 2.5 %
CREAM (GRAM) TOPICAL 3 TIMES DAILY PRN
Status: DISCONTINUED | OUTPATIENT
Start: 2021-09-07 | End: 2021-09-09 | Stop reason: HOSPADM

## 2021-09-07 RX ADMIN — Medication: at 13:57

## 2021-09-07 RX ADMIN — Medication 2 MILLI-UNITS/MIN: at 18:41

## 2021-09-07 RX ADMIN — IBUPROFEN 600 MG: 600 TABLET ORAL at 21:30

## 2021-09-07 RX ADMIN — Medication 340 ML/HR: at 19:45

## 2021-09-07 RX ADMIN — CEFAZOLIN SODIUM 2 G: 2 INJECTION, SOLUTION INTRAVENOUS at 21:10

## 2021-09-07 RX ADMIN — SODIUM CHLORIDE, POTASSIUM CHLORIDE, SODIUM LACTATE AND CALCIUM CHLORIDE: 600; 310; 30; 20 INJECTION, SOLUTION INTRAVENOUS at 14:08

## 2021-09-07 RX ADMIN — SODIUM CHLORIDE, POTASSIUM CHLORIDE, SODIUM LACTATE AND CALCIUM CHLORIDE: 600; 310; 30; 20 INJECTION, SOLUTION INTRAVENOUS at 12:22

## 2021-09-07 ASSESSMENT — ACTIVITIES OF DAILY LIVING (ADL)
NUMBER_OF_TIMES_PATIENT_HAS_FALLEN_WITHIN_LAST_SIX_MONTHS: 1
TOILETING_ISSUES: NO
FALL_HISTORY_WITHIN_LAST_SIX_MONTHS: YES

## 2021-09-07 NOTE — ANESTHESIA PREPROCEDURE EVALUATION
Anesthesia Pre-Procedure Evaluation    Patient: Catalina Hampton   MRN: [id number] : 2000        Preoperative Diagnosis: * No surgery found *   Procedure : LABOR EPIDURAL     Past Medical History:   Diagnosis Date     UTI (urinary tract infection)       History reviewed. No pertinent surgical history.   No Known Allergies   Social History     Tobacco Use     Smoking status: Never Smoker     Smokeless tobacco: Never Used   Substance Use Topics     Alcohol use: No      Wt Readings from Last 1 Encounters:   18 54.4 kg (120 lb) (34 %, Z= -0.42)*     * Growth percentiles are based on CDC (Girls, 2-20 Years) data.        Anesthesia Evaluation            ROS/MED HX  ENT/Pulmonary:  - neg pulmonary ROS     Neurologic:  - neg neurologic ROS     Cardiovascular:  - neg cardiovascular ROS     METS/Exercise Tolerance:     Hematologic:       Musculoskeletal:       GI/Hepatic:       Renal/Genitourinary:       Endo:       Psychiatric/Substance Use:       Infectious Disease:       Malignancy:       Other:               OUTSIDE LABS:  CBC:   Lab Results   Component Value Date    WBC 12.5 (H) 2021    WBC 12.1 (H) 2021    HGB 10.5 (L) 2021    HGB 10.2 (L) 2021    HCT 32.8 (L) 2021    HCT 31.2 (L) 2021     2021     2021     BMP:   Lab Results   Component Value Date     2017     2017    POTASSIUM 3.7 2017    POTASSIUM 4.1 2017    CHLORIDE 109 2017    CHLORIDE 107 2017    CO2 21 2017    CO2 21 2017    BUN 13 2017    BUN 11 2017    CR 0.79 2021    CR 0.70 2017    GLC 82 2017    GLC 80 2017     COAGS: No results found for: PTT, INR, FIBR  POC:   Lab Results   Component Value Date    HCG Negative 2018     HEPATIC:   Lab Results   Component Value Date    ALBUMIN 3.7 2017    PROTTOTAL 7.6 2017    ALT 13 2021    AST 15 2021    ALKPHOS 40 2017     BILITOTAL 0.3 09/02/2017     OTHER:   Lab Results   Component Value Date    JARON 9.0 (L) 09/02/2017       Anesthesia Plan    ASA Status:  2      Anesthesia Type: Epidural.              Consents            Postoperative Care            Comments:                Salvador Winston MD

## 2021-09-07 NOTE — PROGRESS NOTES
Summary:  Pt became increasingly uncomfortable with ctxs.  Position changes/standing/ball.  TIGRE Dumont CNM notified - pt requesting SVE to determine if and what pain med she would like.  SVE by CNM 5/90/-1.  Pt opted for an epidural.  LR bolus.  Elevated BPs reported to CNM.  Afrebile.  Dr Lyle and JOSE GUADALUPE residents at bedside.  Pt became increasingly more comfortable after the epidural.  DYLON. S. MARCO and Dr Lyle in to reassess pt.  Early/variable decels with ctx peaks.  Repositioned to lateral tilt and then to right lateral with peanut ball.  DYLON. S. RAFAELM updated and reviewed tracing.  VSS.  Pt is comfortable.  Pt and family requested to nap.  Shift change/bedside report to CÉSAR carrizales.  Abuse screen needs to be completed when pt is alone.  Pt's advance directive was notorized and is in chart.

## 2021-09-07 NOTE — PROGRESS NOTES
Blood pressure (!) 142/76, pulse 78, temperature 98  F (36.7  C), temperature source Oral, resp. rate 16, last menstrual period 12/05/2020.  Patient Vitals for the past 24 hrs:   BP Temp Temp src Pulse Resp   09/07/21 1208 (!) 142/76 98  F (36.7  C) Oral 78 16   09/07/21 1100 -- 98  F (36.7  C) Oral -- --   09/07/21 1000 -- 97.8  F (36.6  C) Oral -- --   09/07/21 0907 -- 97.5  F (36.4  C) Oral -- --   09/07/21 0808 124/72 98.1  F (36.7  C) Oral 78 16     General appearance: Pt much more uncomfortable, tearful. Considering pain management options. Would like cervical exam. Has been on ball, ambulating. Well supported by Abraham and his mom.   CONTRACTIONS: every 3-5 minutes  Pitocin- none,  Antibiotics- none  FETAL HEART TONES: continuous EFM- baseline 145 with moderate variability and positive accelerations. Early decelerations.  ROM: clear fluid  PELVIC EXAM: 5/90/-1, caput and molding    # Pain Assessment:  Current Pain Score 9/7/2021   Patient currently in pain? yes   Pain score (0-10) -   - Catalina is experiencing pain due to contractions. Pain management was discussed and the plan was created in a collaborative fashion.  Catalina's response to the current recommendations: engaged  - Would like epidural    ASSESSMENT:  ==============  IUP @ 39w3d, SROM,active labor   Fetal Heart Rate Tracing category one  GBS- negative    5/90/-1  SROM x >8 hours, clear, afebrile    Patient Active Problem List   Diagnosis     Encounter for triage in pregnant patient     History of recent stressful life event     New daily persistent headache     Not immune to hepatitis B virus     Supervision of high risk pregnancy, antepartum     Coordination of complex care     Labor and delivery indication for care or intervention     PLAN:  ===========  Discussed pain management options.   Pt desires epidural. Would like to rest/relax after placement.  Recommended side lying release and sifting once comfortable with epidural.  Pt  agreeable.  IV is now in place. IVF bolus started by RN.  Anesthesia called and provider to provider report given.     NAOMI Loza, CNM

## 2021-09-07 NOTE — CONSULTS
SW checked in with family about health care directive. She has the paperwork with her, but just needs it notarized. Sw also provided paperwork and information about DOPA. JEAN CLAUDE asked Miya sanchez, to see the patient to notarize documents.     No other questions or concerns.     Please page SW again or add another consult if pt needs anything.     STUART Canela, [facility]  Maternal and Child Health   Office: [phone]  Pager: [phone]  After Hours Pager: [phone]  [email or web address]

## 2021-09-07 NOTE — PROVIDER NOTIFICATION
21 0805   Provider Notification   Provider Name/Title DYLON Dumont CNM   Method of Notification Electronic Page   Request Evaluate in Person   Notification Reason Patient Arrived;Membrane Status;Labor Status    39.3 wks GBS negative.  Here for r/o rom and labor.  Leaking and ctxs since 400.  +FM. No vag bleeding.

## 2021-09-07 NOTE — PLAN OF CARE
"Pt is  at 39.3 wks GBS negative.  Here reporting possible SROM at 0400.  Leaking clear/pinkish fluid per pt.  Ctxs q 3-5\" since 0400.  +FM.  No vag bleeding.  Here with partner, Abraham and his mother, Anna Marie.  Pt started a advance health directive - but doesn't have it with her and needs it to be notorized.  LSCW order placed to see pt and discuss.  ROM Plus collected and sent.  Clear/pinkish fluid noted on swab.  Ctxs q 3-6\" palpating mild.  Pt coping well with the ctxs.   mod w occ variable and accels.  VSS.  D. MARCO Dumont notified of pt and will see pt.  Call light is within reach.    "

## 2021-09-07 NOTE — PLAN OF CARE
ROM plus is positive.  SVE by DYLON Dumont CNM 4/80-90/-1. Cephalic per bedside US.  V scant clear/pinkish fluid seen.   mod with accels cat 1 at this time.  Labs collected.  Covid is pending.  Pt has edema in both lower legs/feet for the past 1 month.  See note for prenatal labs.  Pt would like to go natural but is open to medications.  Afrebile.  Here with Abraham and Abraham's mother, Anna Marie.  Pt is coping well with ctxs.  On telemetry in 481 - position changes.  Using ball and other labor support.  Order placed for LSCW to see re:  advance health care directive - pt started form but needs it notorized.  Form is not with her.  Call light is within reach.  Pt given choice by MARCO to hold on saline lock placement.  Pt prefers to hold at this time.  Eating/drinking.

## 2021-09-07 NOTE — PROGRESS NOTES
Blood pressure 138/74, pulse 69, temperature 97.9  F (36.6  C), temperature source Oral, resp. rate 16, last menstrual period 12/05/2020, SpO2 98 %.  Patient Vitals for the past 24 hrs:   BP Temp Temp src Pulse Resp SpO2   09/07/21 1700 -- -- -- -- -- 98 %   09/07/21 1648 138/74 97.9  F (36.6  C) Oral -- -- 97 %   09/07/21 1618 134/76 -- -- -- -- 98 %   09/07/21 1549 129/68 98.4  F (36.9  C) Oral -- -- 99 %   09/07/21 1519 132/77 -- -- -- -- 100 %   09/07/21 1459 -- -- -- -- -- 99 %   09/07/21 1448 120/68 98.4  F (36.9  C) Oral 69 16 99 %   09/07/21 1447 120/68 -- -- -- -- --   09/07/21 1444 -- -- -- -- -- 99 %   09/07/21 1442 128/76 -- -- 64 16 98 %   09/07/21 1438 128/70 -- -- 71 16 99 %   09/07/21 1437 128/70 -- -- -- -- --   09/07/21 1434 -- -- -- -- -- 98 %   09/07/21 1432 135/70 -- -- 72 16 99 %   09/07/21 1430 135/70 -- -- -- -- 99 %   09/07/21 1427 139/74 -- -- 75 16 99 %   09/07/21 1422 133/81 -- -- -- 16 99 %   09/07/21 1420 138/76 -- -- -- -- 99 %   09/07/21 1417 138/76 -- -- 74 16 99 %   09/07/21 1415 137/82 -- -- -- -- --   09/07/21 1414 137/82 -- -- -- -- 99 %   09/07/21 1411 139/76 98.1  F (36.7  C) Oral -- -- 100 %   09/07/21 1409 -- -- -- -- -- 100 %   09/07/21 1406 139/76 -- -- -- -- --   09/07/21 1404 (!) 149/82 -- -- -- -- 100 %   09/07/21 1402 (!) 153/80 -- -- -- -- 100 %   09/07/21 1400 (!) 153/80 -- -- -- 16 --   09/07/21 1359 -- -- -- -- -- 100 %   09/07/21 1358 139/79 -- -- -- -- --   09/07/21 1356 (!) 141/89 -- -- -- -- --   09/07/21 1354 -- -- -- -- -- 99 %   09/07/21 1349 -- -- -- -- -- 99 %   09/07/21 1334 -- -- -- -- -- 99 %   09/07/21 1330 -- -- -- -- -- 100 %   09/07/21 1325 -- -- -- -- -- 99 %   09/07/21 1323 (!) 147/87 -- -- -- -- --   09/07/21 1318 (!) 147/87 -- -- -- 16 99 %   09/07/21 1300 -- 98.1  F (36.7  C) Oral -- -- --   09/07/21 1208 (!) 142/76 98  F (36.7  C) Oral 78 16 --   09/07/21 1100 -- 98  F (36.7  C) Oral -- -- --   09/07/21 1000 -- 97.8  F (36.6  C) Oral -- -- --    21 0907 -- 97.5  F (36.4  C) Oral -- -- --   21 0808 124/72 98.1  F (36.7  C) Oral 78 16 --     General appearance: Labored down. Feeling more pressure. Started pushing at 1818. Excellent maternal effort, noticeable fetal descent.  CONTACTIONS: every 2-5 minutes; palpating less firm than previously  Pitocin- none,  Antibiotics- none  FETAL HEART TONES: continuous EFM- baseline 145 with moderate variability and positive accelerations. Intermittent variable decelerations with pushing  ROM: clear fluid  PELVIC EXAM: 10/100/+1    # Pain Assessment:  Current Pain Score 2021   Patient currently in pain? denies   Pain score (0-10) -   Catalina giraldo pain level was assessed and she currently denies pain.        ASSESSMENT:  ==============  IUP @ 39w3d, second stage   Fetal Heart Rate Tracing category two  GBS- negative    Pushing   Patient Active Problem List   Diagnosis     Encounter for triage in pregnant patient     History of recent stressful life event     New daily persistent headache     Not immune to hepatitis B virus     Supervision of high risk pregnancy, antepartum     Coordination of complex care     Labor and delivery indication for care or intervention     PLAN:  ===========  Continue pushing efforts.   Recommended initiation of low dose pitocin to increase contraction strength.  Pt agreeable.  RN started at 2mu.  Anticipate .    NAOMI Loza, MARCO Dumont CNM

## 2021-09-07 NOTE — PROVIDER NOTIFICATION
09/07/21 0910   Provider Notification   Provider Name/Title urvashi isaac cnm   Method of Notification Electronic Page   Request Evaluate in Person   Notification Reason Membrane Status   ROM + positive.  Please place covid testing order.

## 2021-09-07 NOTE — H&P
ADMIT NOTE  =================  39w3d    Catalina Hampton is a 21 year old female with an Patient's last menstrual period was 2020. and Estimated Date of Delivery: Sep 11, 2021 [facility] at 9:20 AM for SROM, early labor.     HPI  ================  Pt woke up to a gush of fluid at 0400 this morning. Clear with pink tinge. Started feeling contractions. Reports they have increased in frequency and strength. Coping well. ROM Plus positive in triage.    Denies fever, cough, SOB or chest pain. Denies having contact with anyone who is Covid-19 positive. Agreeable to Covid-19 testing. Had COVID 2020.    Contractions- every 5-7 minutes  Fetal movement- active  ROM- yes small, clear. SROM @ 0400.  Vaginal bleeding- none  GBS- negative  FOB- is involved, Abraham.  Other labor support- Abraham's mother    Weight gain- 195 - 162 lbs, Total weight gain- 33 lbs  Height- 63    [facility]  First prenatal visit at 10+6 weeks, Total visits- 9    OTHER:  - Hep B nonimmune- received 1st 2 doses of vaccine this pregnancy  - COVID in 2020  - Hx of migraines  - Hx of trauma- father, murdered mother    PROBLEM LIST  =================  Patient Active Problem List    Diagnosis Date Noted     Labor and delivery indication for care or intervention 2021     Priority: Medium     Encounter for triage in pregnant patient 2021     Priority: Medium     21: Baseline HELLP labs WNL; KB WNL (from fall, see triage note)       Not immune to hepatitis B virus 2021     Priority: Medium     Formatting of this note might be different from the original.  21 - #1 given  21- #2 given       Supervision of high risk pregnancy, antepartum 2021     Priority: Medium     Formatting of this note might be different from the original.  [facility] CNM   Partner's name: Abraham  [X ] Entered on [facility] active OB patient list  [ ] NOB folder  [X] First tri screen  declined  [X] QS/AFP  ordered  [X ] Started ASA   [X ] Fetal anatomy US ordered  [X ] Rubella immune  [X ] Varicella Immune  [X ] Hep B NON Immune - see problem  [X ] Pap up to date/due 2024    [X] EOB folder  [ ] FLU shot  [X ] GCT, passed  [NA] Rhogam if needed, date:  [NA] TOLAC consent done  [X] Waterbirth ,consent done  [X] Breast pump  [X] Car seat  [X] TDAP given  [X] PP Contraception plan: undecided  [X ] Labor plans: low intervention/mobility  [X] : yes  [X] Infant feeding plan: breastfeeding   [ ] Infant pediatrician    [X] GBS neg  [X] OTC PP meds sent       Coordination of complex care 2021     Priority: Medium     New daily persistent headache 2019     Priority: Medium     History of recent stressful life event 2017     Priority: Medium       HISTORIES  ============  No Known Allergies  Past Medical History:   Diagnosis Date     UTI (urinary tract infection)      History reviewed. No pertinent surgical history..  History reviewed. No pertinent family history.  Social History     Tobacco Use     Smoking status: Never Smoker     Smokeless tobacco: Never Used   Substance Use Topics     Alcohol use: No     OB History    Para Term  AB Living   2 0 0 0 1 0   SAB TAB Ectopic Multiple Live Births   1 0 0 0 0      # Outcome Date GA Lbr Hollis/2nd Weight Sex Delivery Anes PTL Lv   2 Current            1 SAB                 LABS:   ===========  Prenatal Labs:  Rhogam not indicated   Lab Results   Component Value Date    HGB 10.2 (L) 2021     Rubella immune  GBS negative    Other labs: COVID pending today    COVID-19 PCR Results    COVID-19 PCR Results 20   COVID-19 Virus by PCR (External Result) Positive (A)   (A) Abnormal value          COVID-19 Antibody Results, Testing for Immunity    COVID-19 Antibody Results, Testing for Immunity   No data to display.            Results for orders placed or performed during the hospital encounter of 21 (from the past 24 hour(s))   Rupture of  Fetal Membranes by ROM Plus   Result Value Ref Range    Rupture of Fetal Membranes by ROM Plus Positive (A) Negative, Invalid, Suggest Repeat    Narrative    It is recommended that the tests to detect rupture of the amniotic membranes should not be used without other clinical assessments to make clinical patient management decision.       ROS  =========  Pt denies significant respiratory, cardiovacular, GI, or muscular/skeletalcomplaints.    See RN data base ROS.     PHYSICAL EXAM:  ===============  /72   Pulse 78   Temp 97.5  F (36.4  C) (Oral)   Resp 16   LMP 12/05/2020   General appearance: uncomfortable with contractions  GENERAL APPEARANCE: healthy, alert and no distress  RESP: lungs clear to auscultation - no rales, rhonchi or wheezes  CV: regular rates and rhythm, normal S1 S2, no S3 or S4 and no murmur,and no varicosities  ABDOMEN:  soft, nontender, no epigastric pain  SKIN: no suspicious lesions or rashes  NEURO: Denies headache, blurred vision, other vision changes  PSYCH: mentation appears normal. and affect normal/bright  Legs: Reflexes normal bilaterally and 2+ edema     Abdomen: gravid, vertex fetus per Leopold's, non-tender between contractions.   Cephalic presentation confirmed by BSUS  EFW-  8 lbs.   CONTRACTIONS: every 5-7 minutes  FETAL HEART TONES: continuous EFM- baseline 140 with moderate variability and positive accelerations. No decelerations.  PELVIC EXAM: 4/80/-1, ant/soft  BLOODY SHOW: no   ROM:no  FLUID: clear  ROMPLUS: POSITIVE    # Pain Assessment:  Current Pain Score 9/7/2021   Patient currently in pain? yes   Pain score (0-10) -   - Catalina is experiencing pain due to contractions. Pain management was discussed and the plan was created in a collaborative fashion.  Catalina's response to the current recommendations: engaged  - unmedicated      ASSESSMENT:  ==============  IUP @ 39w3d admitted SROM x 5.5 hours   NST REACTIVE  Fetal Heart Tones - category one  GBS-  negative  Covid- pending    /-1  Clear fluid, afebrile    Patient Active Problem List   Diagnosis     Encounter for triage in pregnant patient     History of recent stressful life event     New daily persistent headache     Not immune to hepatitis B virus     Supervision of high risk pregnancy, antepartum     Coordination of complex care     Labor and delivery indication for care or intervention       PLAN:  ===========  Admit - see IP orders.  Admission labs collected- abo/rh t&s, cbc with plts, anti-trep.   Will defer saline lock at this time per pt request. Place prn.  Reviewed expectant management. Discussed potential for pitocin augmentation in labor if needed.   Ambulation, hydration, position changes, birthing ball and tub options to facilitate labor reviewed with pt.  Monitor fluid color, maternal temp, FHR, s/s triple I closely.  Reevaluate prn per maternal/fetal indications.   Anticipate     Kyree Dumont, NAOMI, CNM

## 2021-09-07 NOTE — ANESTHESIA PROCEDURE NOTES
Epidural catheter Procedure Note  Pre-Procedure   Staff -        Anesthesiologist:  Tamiko Lyle MD       Resident/Fellow: Salvador Winston MD       Performed By: anesthesiologist       Location: OB  Timeout:       Correct Patient: Yes        Correct Procedure: Yes        Correct Site: Yes        Correct Position: Yes   Procedure Documentation  Procedure: epidural catheter       Patient Position: sitting       Patient Prep/Sterile Barriers: sterile gloves, patient draped       Skin prep: Chloraprep      Local skin infiltrated with mL of 1% lidocaine.  (midline approach).       Technique: LORT saline        Needle Type: [employer] needle       Needle Gauge: 17.        Needle Length (Inches): 3.5        Catheter: 19 G.          5.5 cm epidural space.         Threaded 10 cm at skin.         # of attempts: 3 and  # of redirects:  3    Assessment/Narrative         Paresthesias: No.      Test dose of 3 mL lidocaine 1.5% w/ 1:200,000 epinephrine at 13:57 CDT.        .       Insertion/Infusion Method: LORT saline    Medication(s) Administered   0.125% Bupivacaine + 2 mcg/mL Fentanyl via CADD (Epidural), 10 mL  Medication Administration Time: 9/7/2021 2:00 PM

## 2021-09-07 NOTE — PROGRESS NOTES
Blood pressure 138/76, pulse 74, temperature 98.1  F (36.7  C), temperature source Oral, resp. rate 16, last menstrual period 12/05/2020, SpO2 99 %.  Patient Vitals for the past 24 hrs:   BP Temp Temp src Pulse Resp SpO2   09/07/21 1417 138/76 -- -- 74 16 99 %   09/07/21 1415 137/82 -- -- -- -- --   09/07/21 1414 137/82 -- -- -- -- 99 %   09/07/21 1411 139/76 98.1  F (36.7  C) Oral -- -- 100 %   09/07/21 1409 -- -- -- -- -- 100 %   09/07/21 1406 139/76 -- -- -- -- --   09/07/21 1404 (!) 149/82 -- -- -- -- 100 %   09/07/21 1402 (!) 153/80 -- -- -- -- 100 %   09/07/21 1400 (!) 153/80 -- -- -- -- --   09/07/21 1359 -- -- -- -- -- 100 %   09/07/21 1358 139/79 -- -- -- -- --   09/07/21 1356 (!) 141/89 -- -- -- -- --   09/07/21 1349 -- -- -- -- -- 99 %   09/07/21 1334 -- -- -- -- -- 99 %   09/07/21 1323 (!) 147/87 -- -- -- -- --   09/07/21 1318 (!) 147/87 -- -- -- -- 99 %   09/07/21 1300 -- 98.1  F (36.7  C) Oral -- -- --   09/07/21 1208 (!) 142/76 98  F (36.7  C) Oral 78 16 --   09/07/21 1100 -- 98  F (36.7  C) Oral -- -- --   09/07/21 1000 -- 97.8  F (36.6  C) Oral -- -- --   09/07/21 0907 -- 97.5  F (36.4  C) Oral -- -- --   09/07/21 0808 124/72 98.1  F (36.7  C) Oral 78 16 --     General appearance: Spoke with anesthesia, difficult epidural placement. Just finished procedure. Pt crying with contractions. Over several contractions did note that they are starting to feel shorter.   CONTRACTIONS: every 2-4 minutes  Pitocin- none,  Antibiotics- none  FETAL HEART TONES: continuous EFM- baseline 145 with moderate variability and positive accelerations. Intermittent early decelerations.  ROM: clear fluid  PELVIC EXAM:deferred    # Pain Assessment:  Current Pain Score 9/7/2021   Patient currently in pain? yes   Pain score (0-10) -   - Catalina is experiencing pain due to contractions. Pain management was discussed and the plan was created in a collaborative fashion.  Catalina's response to the current recommendations:  engaged  - heat pack, epidural placed    ASSESSMENT:  ==============  IUP @ 39w3d, SROM, active labor   Fetal Heart Rate Tracing category one  GBS- negative    ROM @ 0400, clear, afebrile  Patient Active Problem List   Diagnosis     Encounter for triage in pregnant patient     History of recent stressful life event     New daily persistent headache     Not immune to hepatitis B virus     Supervision of high risk pregnancy, antepartum     Coordination of complex care     Labor and delivery indication for care or intervention     PLAN:  ===========  Support provided. Discussed that it can take >30 minutes for epidural to provide full relief.   Anesthesia plans to check back in on patient.  Continue to monitor fluid color, maternal temp, FHR, s/s triple I.  Had elevated BPs - will plan pre-e labs when pt comfortable.   Reevaluate prn per maternal/fetal indications.    NAOMI Loza, CNM

## 2021-09-07 NOTE — PROVIDER NOTIFICATION
09/07/21 1504   Provider Notification   Provider Name/Title D MARCO GUSMAN   Method of Notification In Department   REVIEWED TRACING.  VARIABLES/EARLY W CTXS.  REPOSITIONED TO LATERAL WITH PEANUT BALL.  PT WANTS TO SLEEP - COMFORTABLE.

## 2021-09-07 NOTE — PROVIDER NOTIFICATION
09/07/21 1108   Provider Notification   Provider Name[place] [facility]   Method of Notification Electronic Page   Request Evaluate in Person   Notification Reason Other (Comment)   pt has questions regarding advance care directive.  Order in place.  Pt is okay to see now before labor progresses.

## 2021-09-07 NOTE — PROGRESS NOTES
Blood pressure 129/68, pulse 69, temperature 98.4  F (36.9  C), temperature source Oral, resp. rate 16, last menstrual period 12/05/2020, SpO2 99 %.  Patient Vitals for the past 24 hrs:   BP Temp Temp src Pulse Resp SpO2   09/07/21 1549 129/68 98.4  F (36.9  C) Oral -- -- 99 %   09/07/21 1519 132/77 -- -- -- -- 100 %   09/07/21 1459 -- -- -- -- -- 99 %   09/07/21 1448 120/68 98.4  F (36.9  C) Oral 69 16 99 %   09/07/21 1447 120/68 -- -- -- -- --   09/07/21 1444 -- -- -- -- -- 99 %   09/07/21 1442 128/76 -- -- 64 16 98 %   09/07/21 1438 128/70 -- -- 71 16 99 %   09/07/21 1437 128/70 -- -- -- -- --   09/07/21 1434 -- -- -- -- -- 98 %   09/07/21 1432 135/70 -- -- 72 16 99 %   09/07/21 1430 135/70 -- -- -- -- 99 %   09/07/21 1427 139/74 -- -- 75 16 99 %   09/07/21 1422 133/81 -- -- -- 16 99 %   09/07/21 1420 138/76 -- -- -- -- 99 %   09/07/21 1417 138/76 -- -- 74 16 99 %   09/07/21 1415 137/82 -- -- -- -- --   09/07/21 1414 137/82 -- -- -- -- 99 %   09/07/21 1411 139/76 98.1  F (36.7  C) Oral -- -- 100 %   09/07/21 1409 -- -- -- -- -- 100 %   09/07/21 1406 139/76 -- -- -- -- --   09/07/21 1404 (!) 149/82 -- -- -- -- 100 %   09/07/21 1402 (!) 153/80 -- -- -- -- 100 %   09/07/21 1400 (!) 153/80 -- -- -- 16 --   09/07/21 1359 -- -- -- -- -- 100 %   09/07/21 1358 139/79 -- -- -- -- --   09/07/21 1356 (!) 141/89 -- -- -- -- --   09/07/21 1354 -- -- -- -- -- 99 %   09/07/21 1349 -- -- -- -- -- 99 %   09/07/21 1334 -- -- -- -- -- 99 %   09/07/21 1330 -- -- -- -- -- 100 %   09/07/21 1325 -- -- -- -- -- 99 %   09/07/21 1323 (!) 147/87 -- -- -- -- --   09/07/21 1318 (!) 147/87 -- -- -- 16 99 %   09/07/21 1300 -- 98.1  F (36.7  C) Oral -- -- --   09/07/21 1208 (!) 142/76 98  F (36.7  C) Oral 78 16 --   09/07/21 1100 -- 98  F (36.7  C) Oral -- -- --   09/07/21 1000 -- 97.8  F (36.6  C) Oral -- -- --   09/07/21 0907 -- 97.5  F (36.4  C) Oral -- -- --   09/07/21 0808 124/72 98.1  F (36.7  C) Oral 78 16 --     General appearance: Pt  was able to nap. RN performed st cath. Pt comfortable with epidural. Feels some mild pressure. Agreeable to sve.   CONTRACTIONS: every 2-4 minutes  Pitocin- none,  Antibiotics- none  FETAL HEART TONES: continuous EFM- baseline 140 with moderate variability and positive accelerations. No decelerations.  ROM: clear fluid  PELVIC EXAM: 10/100/0    # Pain Assessment:  Current Pain Score 9/7/2021   Patient currently in pain? denies   Pain score (0-10) -   Catalina giraldo pain level was assessed and she currently denies pain.        ASSESSMENT:  ==============  IUP @ 39w3d, SROM, active labor   Fetal Heart Rate Tracing category one  GBS- negative    10/100/0  Ruptured x 13 hours, clear fluid, afebrile    Patient Active Problem List   Diagnosis     Encounter for triage in pregnant patient     History of recent stressful life event     New daily persistent headache     Not immune to hepatitis B virus     Supervision of high risk pregnancy, antepartum     Coordination of complex care     Labor and delivery indication for care or intervention     PLAN:  ===========  Complete dilation. Pt and support people very excited.   Reviewed options of starting pushing or laboring down x 1-2 hours. Discussed r/b/a.   Pt desires to labor down. Repositioned to throne.   Will reassess in 1-2 hours or sooner if constant pressure or urge to push.    NAOMI Loza, CNM

## 2021-09-08 LAB — HGB BLD-MCNC: 8.7 G/DL (ref 11.7–15.7)

## 2021-09-08 PROCEDURE — 85018 HEMOGLOBIN: CPT | Performed by: ADVANCED PRACTICE MIDWIFE

## 2021-09-08 PROCEDURE — 120N000002 HC R&B MED SURG/OB UMMC

## 2021-09-08 PROCEDURE — 250N000011 HC RX IP 250 OP 636: Performed by: REGISTERED NURSE

## 2021-09-08 PROCEDURE — 36415 COLL VENOUS BLD VENIPUNCTURE: CPT | Performed by: ADVANCED PRACTICE MIDWIFE

## 2021-09-08 PROCEDURE — 250N000013 HC RX MED GY IP 250 OP 250 PS 637: Performed by: ADVANCED PRACTICE MIDWIFE

## 2021-09-08 PROCEDURE — 258N000003 HC RX IP 258 OP 636: Performed by: REGISTERED NURSE

## 2021-09-08 RX ORDER — METHYLPREDNISOLONE SODIUM SUCCINATE 125 MG/2ML
125 INJECTION, POWDER, LYOPHILIZED, FOR SOLUTION INTRAMUSCULAR; INTRAVENOUS
Status: DISCONTINUED | OUTPATIENT
Start: 2021-09-08 | End: 2021-09-09 | Stop reason: HOSPADM

## 2021-09-08 RX ORDER — DIPHENHYDRAMINE HYDROCHLORIDE 50 MG/ML
50 INJECTION INTRAMUSCULAR; INTRAVENOUS
Status: DISCONTINUED | OUTPATIENT
Start: 2021-09-08 | End: 2021-09-09 | Stop reason: HOSPADM

## 2021-09-08 RX ADMIN — IBUPROFEN 800 MG: 800 TABLET ORAL at 06:26

## 2021-09-08 RX ADMIN — DOCUSATE SODIUM 100 MG: 100 CAPSULE, LIQUID FILLED ORAL at 07:44

## 2021-09-08 RX ADMIN — IRON SUCROSE 300 MG: 20 INJECTION, SOLUTION INTRAVENOUS at 09:23

## 2021-09-08 RX ADMIN — ACETAMINOPHEN 650 MG: 325 TABLET, FILM COATED ORAL at 02:38

## 2021-09-08 RX ADMIN — IBUPROFEN 800 MG: 800 TABLET ORAL at 19:41

## 2021-09-08 RX ADMIN — IBUPROFEN 800 MG: 800 TABLET ORAL at 13:29

## 2021-09-08 RX ADMIN — ACETAMINOPHEN 650 MG: 325 TABLET, FILM COATED ORAL at 22:02

## 2021-09-08 NOTE — PLAN OF CARE
Data: Patient transferred up from labor in the evening. Patient had elevated BP of 148/93; later was 120/79. All other vitals WNL. Postpartum checks within normal limits - fundus firm at U/U. Patient eating and drinking normally. Patient able to empty bladder independently and is up ambulating. No apparent signs of infection. Patient performing self cares and is able to care for infant.  Action: Patient medicated during the shift for pain and cramping. See MAR. Patient stated she was comfortable.   Response: Positive attachment behaviors observed with infant. Support person, significant other, present.   Plan: Continue with plan of care.

## 2021-09-08 NOTE — LACTATION NOTE
This note was copied from a baby's chart.  Consult for: First time breastfeeding     Delivery Information: Baby Candy was born at 39.3 weeks via vaginal delivery on 21 at 1944.     Maternal Health History: Catalina has a history of depression. She had Covid in 2020.?    Maternal Breast Exam: Catalina noted breast growth and sensitivity in early pregnancy. Her breasts are soft and symmetrical with bilateral intact, everted nipples. She has a slight abrasion on her left nipple. She was encouraged to use colostrum to the nipple and notify RN or provider if abrasion worsens.     Infant information: Candy has age appropriate output. She was AGA at birth at 7lb 8oz. She is <24 hours old. ?    Feeding Assessment: Candy was initially sleepy and disinterested at the breast. Catalina was shown how to hand express colostrum and she easily expressed about 2ml. This was spoon fed to infant. Following colostrum infant was more alert and showing feeding cues. She was brought back to the breast.  Catalina initially had a shallow latch and felt pain. She was shown how to break the latch and re-latch, nose to nipple to achieve a deeper, asymmetric latch. This latch was comfortable and infant was heard swallowing frequently throughout the feeding.     Education: early feeding cues, benefits of feeding on cue, breastfeeding positions, signs breastfeeding is going well (comfortable latch, age appropriate output and weight loss, swallowing heard at the breast), satiety cues, expected  output,  weight loss, nutritive vs non-nutritive sucking, benefits of skin to skin, the Second Night, benefits of breast massage and hand expression of colostrum,  inpatient lactation support and outpatient lactation resources.     Plan: Continue breastfeeding on cue with RN support as needed with a goal of 8-12 feedings per day. Encourage frequent skin to skin, breast massage and hand expression.     Catalina has a  breastpump to use at home.    She plans to follow up with a [facility] clinic. [place] [facility] [place] [place].  and was encouraged to follow up with outpatient LC as needed after discharge.

## 2021-09-08 NOTE — L&D DELIVERY NOTE
Delivery Summary    Catalina Hampton MRN# [id number]   Age: 21 year old YOB: 2000     Delivery Note    Labor Course: Catalina Hampton is a 21 year old  @ 39w3d admitted to  after SROM of clear fluid at 0400. Arrived to , ROM plus positive. SVE on admission 4/80/-1. Labored unmedicated using birthing ball, ambulation, position changes. Progressed to 5/90/-1 at 1243. Pt requested epidural for pain management. Experienced relief and took a nap. Started to feel some mild pressure. SVE 10/100/0 at 1700.     Delivery Course:  Pt became complete at 1700. Pt desired to labor down. Pushed with great effort in semi fowlers and right side lying.  Delivered a vigorous baby girl at 1943 who was immediately placed on mom's abdomen. No nuchal noted. IV pitocin started after delivery of infant per protocol. Umbilical cord was double clamped and cut by FOB after the cord stopped pulsing. No indication for cord gases. Cord blood obtained. Baby had small meconium bowel movement and voided while on mom's abdomen. Placenta spontaneously delivered intact at 1948 without difficulty via Schultze mechanism. Inspection of vagina and perineum revealed a shallow 2nd degree laceration and bilateral periurethral abrasions. 2nd degree laceration was repaired in the usual fashion with 3-0 vicryl. One interrupted suture with a 4-0 vicryl placed in left periurethral abrasion. Right abrasion was hemostatic and not requiring repair. 1% lidocaine was infiltrated before the repair. St cath performed. Large clots expelled with fundal massage; continued trickling noted. Manual sweep of lower uterine segment performed. Fundus now firm and midline, scant bleeding. 2gm IV ancef ordered.  Mom and baby are stable.      IUP at 39+3 weeks gestation delivered on 2021.     delivery of a viable Female infant.  Weight : 7 pounds 8 ounces   Apgars of 7 at 1 minute and 9 at 5 minutes.  Labor was spontaneous.  Medications  administered  in labor:  Pain Rx Epidural; Antibiotics No; Other n/a  Perineum: shallow 2nd degree  Placenta-mechanism: spontaneous, intact,  with a 3 vessel cord. IV oxytocin was given.  QBL was 563ml.  Anticipated Discharge Date: 21  Complications of pregnancy, labor and delivery: none  Birth attendants:Kyree Dumont CNM, MARCO SALGADO    ASSESSMENT & PLAN:        Ayana Hampton [[id number]]    Labor Event Times    Labor onset date: 21 Onset time: 12:43 PM   Dilation complete date: 21 Complete time:  5:00 PM   Start pushing date/time: 2021 1818      Labor Length    1st Stage (hrs): 4 (min): 17   2nd Stage (hrs): 2 (min): 43   3rd Stage (hrs): 0 (min): 5      Labor Events     labor?: No   steroids: None  Labor Type: Spontaneous  Predominate monitoring during 1st stage: continuous electronic fetal monitoring     Antibiotics received during labor?: No     Rupture identifier: Sac 1  Rupture date/time: 21 0400   Rupture type: Spontaneous rupture of membranes occuring during spontaneous labor or augmentation  Fluid color: Clear, Pink  Fluid odor: Normal     Augmentation: Oxytocin  Indications for augmentation: Ineffective Contraction Pattern  1:1 continuous labor support provided by?: RN Labor partogram used?: no      Delivery/Placenta Date and Time    Delivery Date: 21 Delivery Time:  7:43 PM   Placenta Date/Time: 2021  7:48 PM  Oxytocin given at the time of delivery: after delivery of baby  Delivering clinician: Kyree Dumont CNM   Other personnel present at delivery:  Provider Role   Anushka Teran, RN Delivery Nurse         Vaginal Counts     Initial count performed by 2 team members:  Two Team Members   MARCO Teran RN       [place] Suture Needles Sponges (RETIRED) Instruments   Initial counts 2 1 5    Added to count 0 0 0    Relief counts       Final counts 2 1 5          Placed during labor Accounted for at  "the end of labor   FSE No NA   IUPC No NA   Cervadil No NA              Final count performed by 2 team members:  Two Team Members   Anushka Dumont      Final count correct?: Yes     Apgars    Living status: Living   1 Minute 5 Minute 10 Minute 15 Minute 20 Minute   Skin color: 1  1       Heart rate: 2  2       Reflex irritability: 1  2       Muscle tone: 1  2       Respiratory effort: 2  2       Total: 7  9       Apgars assigned by: KELLY FERRER RN     Cord    Vessels: 3 Vessels    Cord Complications: None               Cord Blood Disposition: Lab    Gases Sent?: No    Delayed cord clamping?: Yes    Cord Clamping Delay (seconds):  seconds    Stem cell collection?: No       Randolph Resuscitation     Care at Delivery:  of vial Female at 1943. Infant immediately brought to mother's abdomen for skin-to-skin, stimulated and suctioned with a bulb. 1 min and 5 mins Apgar's 7 and 9.   Output in Delivery Room: Voided, Stool     Randolph Measurements    Weight: 7 lb 8 oz Length: 1' 7\"   Head circumference: 33 cm    Output in delivery room: Voided, Stool     Skin to Skin and Feeding Plan    Skin to skin initiation date/time: 1841    Skin to skin with: Mother  Skin to skin end date/time:     Breastfeeding initiated date/time: 2021     Labor Events and Shoulder Dystocia    Fetal Tracing Prior to Delivery: Category 2  Fetal Tracing Comments: intermittent variable decels with pushing  Shoulder dystocia present?: Neg     Delivery (Maternal) (Provider to Complete) ([id number])    Episiotomy: None  Perineal lacerations: 2nd Repaired?: Yes   Repair suture: None  Genital tract inspection done: Pos     Blood Loss  Mother: Catalina Hampton #[id number]   Start of Mother's Information    Delivery Blood Loss  21 1243 - 21 2121    Delivery QBL (mL) Hospital Encounter 563 mL    Total  563 mL         End of Mother's Information  Mother: Catalina Hampton #[id number]          " Delivery - Provider to Complete ([id number])    Delivering clinician: Kyree Dumont CNM CNM Care: Exclusive MARCO care in labor  Attempted Delivery Types (Choose all that apply): Spontaneous Vaginal Delivery  Delivery Type (Choose the 1 that will go to the Birth History): Vaginal, Spontaneous                   Other personnel:  Provider Role   Anushka Teran, RN Delivery Nurse                Placenta    Date/Time: 9/7/2021  7:48 PM  Removal: Spontaneous  Comments: intact, georgiae, 3 vessel cord  Disposition: Hospital disposal           Anesthesia    Method: Epidural                Presentation and Position    Presentation: Vertex     Occiput Anterior                 Kyree Dumont CNM

## 2021-09-08 NOTE — PLAN OF CARE
Data: Vital signs and postpartum checks WDL  Patient eating and drinking normally. Patient able to empty bladder independently and is up ambulating.  Patient performing self cares and is able to care for infant. Breastfeeding on demand. Hgb is 8.7 and Iron was infused per order. Removed IV after infusion.   Action: Patient medicated during the shift for pain with Ibuprofen with relief after 1 hour. Patient education done see education record.  Response: Positive attachment behaviors observed with infant. Support persons  present.    Plan: Continue with the plan of care

## 2021-09-08 NOTE — PROGRESS NOTES
Catalina Hampton      MRN#: [id number]  Age: 21 year old      YOB: 2000      PPD #1 S/P   Patient feels well and is without issue, baby is rooming in  Breast feeding status:initiated  Complications since 2 hours post delivery: anemia (hgb 8.7)  Patient is tolerating regular diet,  tolerating acitivity well, voiding without difficulty, cramping is minimal and is relieved by Ibuprofen and tylenol, lochia is decreasing, no clots.  Perineal pain is is minimal and is relieved by Ibuprofen and tylenol.  The perineum laceration is well approximated.   Has not had BM, taking stool softener. Denies dizziness or other s/sx of anemia.        SIGNIFICANT PROBLEMS:  Patient Active Problem List    Diagnosis Date Noted     Labor and delivery indication for care or intervention 2021     Priority: Medium      (normal spontaneous vaginal delivery) 2021     Priority: Medium     Depression, unspecified depression type 2021     Priority: Medium     Encounter for triage in pregnant patient 2021     Priority: Medium     21: Baseline HELLP labs WNL; KB WNL (from , see triage note)       Not immune to hepatitis B virus 2021     Priority: Medium     Formatting of this note might be different from the original.  21 - #1 given  21- #2 given       Supervision of high risk pregnancy, antepartum 2021     Priority: Medium     Formatting of this note might be different from the original.  [facility] CNM   Partner's name: Abraham  [X ] Entered on [facility] active OB patient list  [ ] NOB folder  [X] First tri screen  declined  [X] QS/AFP ordered  [X ] Started ASA   [X ] Fetal anatomy US ordered  [X ] Rubella immune  [X ] Varicella Immune  [X ] Hep B NON Immune - see problem  [X ] Pap up to date/due 2024    [X] EOB folder  [ ] FLU shot  [X ] GCT, passed  [NA] Rhogam if needed, date:  [NA] TOLAC consent done  [X] Waterbirth ,consent done  [X] Breast pump  [X] Car seat  [X] TDAP  given  [X] PP Contraception plan: undecided  [X ] Labor plans: low intervention/mobility  [X] : yes  [X] Infant feeding plan: breastfeeding   [ ] Infant pediatrician    [X] GBS neg  [X] OTC PP meds sent       Coordination of complex care 03/09/2021     Priority: Medium     New daily persistent headache 07/18/2019     Priority: Medium     History of recent stressful life event 08/14/2017     Priority: Medium         PE:    Vitals:    09/07/21 2149 09/07/21 2215 09/08/21 0220 09/08/21 0745   BP: 138/78 (!) 148/93 120/79 116/75   Pulse:  64 77 70   Resp:  16 16 17   Temp:  98.6  F (37  C) 98.1  F (36.7  C) 98.2  F (36.8  C)   TempSrc:  Oral Oral Oral   SpO2: 99% 100%         NAD  Breasts: Soft, filling  Nipples: Intact, Non-tender  Abdomen: Soft, Non-tender      Uterus: Fundus Firm, Non-tender, located 1fb below the umbilicus   Lochia: Rubra, appropriate amount    Perineum:  Well-approximated, healing well  Lower Extremities: trace Edema Bilateral, Negative Becky's Sign        Postpartum hemoglobin    Recent Labs   Lab 09/08/21  0727 09/07/21  2115 09/07/21  0925   HGB 8.7* 9.5* 10.5*     Blood type: A+  Rubella status - immune      Assessment/Plan-   Stable Post-partum day #1  Complications:anemic, plan for iron infusion; elevated mild range BP x1, recheck WNL. HELLP labs WNL  Breastfeeding  Rhophylac not indicated    Teaching done: D/C Instructions: Nutrition/Activity, Engorgement Management, Birth Control Options, Warning Signs/When to Call: Excessive Bleeding, Infection, PP Depression, Kegals and Crunches, RTC Clinic for PP Appointment and PNV    Postpartum warning s/s reviewed, including bleeding/clots, fever, mastitis, or depression    Birthcontrol planned:Combined hormonal method (pills, patch, or ring) and pt does not have a prescription or as d/c meds. Discussed prescription at 6wk pp visit.  Current Discharge Medication List      START taking these medications    Details   acetaminophen (TYLENOL) 325 MG  tablet Take 2 tablets (650 mg) by mouth every 6 hours as needed for mild pain Start after Delivery.  Qty: 100 tablet, Refills: 0    Associated Diagnoses:  (normal spontaneous vaginal delivery)      ibuprofen (ADVIL/MOTRIN) 600 MG tablet Take 1 tablet (600 mg) by mouth every 6 hours as needed for moderate pain Start after delivery  Qty: 60 tablet, Refills: 0    Associated Diagnoses:  (normal spontaneous vaginal delivery)      senna-docusate (SENOKOT-S/PERICOLACE) 8.6-50 MG tablet Take 1 tablet by mouth daily Start after delivery.  Qty: 100 tablet, Refills: 0    Associated Diagnoses:  (normal spontaneous vaginal delivery)         CONTINUE these medications which have NOT CHANGED    Details   Prenatal Vit-Fe Fumarate-FA (PRENATAL MULTIVITAMIN W/IRON) 27-0.8 MG tablet Take 1 tablet by mouth daily         STOP taking these medications       aspirin (ASA) 81 MG chewable tablet Comments:   Reason for Stopping:         ferrous sulfate 140 (45 Fe) MG TBCR CR tablet Comments:   Reason for Stopping:         vitamin B complex with vitamin C (VITAMIN  B COMPLEX) tablet Comments:   Reason for Stopping:         vitamin C (ASCORBIC ACID) 1000 MG TABS Comments:   Reason for Stopping:         Vitamin D3 (CHOLECALCIFEROL) 25 mcg (1000 units) tablet Comments:   Reason for Stopping:               Routine Postpartum Management  -Discussed IV iron infusion for hgb 8.7. Patient agreeable to IV iron infusion today.   -Discussed increased dietary iron intake upon discharge.   Encouraged Postpartum videos before discharge  Anticipate discharge to home tomorrow  RTC 2 week telephone call mood check and 6 week postpartum visit     NAOMI Chavarria CNM

## 2021-09-08 NOTE — PLAN OF CARE
Delivery at 1943 baby girl. Pt doing well.  Pain is well-controlled.  No fevers.  No history of foul-smelling vaginal discharge.  Denies chest pain, shortness of breath, nausea or vomiting.  Vaginal bleeding is similar to a heavy menstrual flow.  Breastfeeding well. Boyfriend at bedside as well FOB mother.

## 2021-09-08 NOTE — PLAN OF CARE
Patient arrived to [facility] unit via wheelchair at 2210,with belongings, accompanied by spouse/ significant other, with infant in arms. Received report from CÉSAR Reveles and checked bands. Unit and room orientation completed. Call light within arms reach; no concerns present at this time. Continue with plan of care.

## 2021-09-08 NOTE — PLAN OF CARE
Data: Catalina Hampton transferred to [facility] via wheelchair at 2200. Baby transferred via parent's arms. Straight cath after delivery 400 ml. Pt not voided yet. Running pitocin IV.   Action: Receiving unit notified of transfer: Yes. Patient and family notified of room change. Report given to Desi RN at bedside. Belongings sent to receiving unit. Accompanied by Registered Nurse. Oriented patient to surroundings. Call light within reach. ID bands double-checked with receiving RN.  Response: Patient tolerated transfer and is stable. FOB at bedside.

## 2021-09-09 VITALS
OXYGEN SATURATION: 100 % | RESPIRATION RATE: 16 BRPM | HEART RATE: 71 BPM | SYSTOLIC BLOOD PRESSURE: 125 MMHG | TEMPERATURE: 98.1 F | DIASTOLIC BLOOD PRESSURE: 74 MMHG

## 2021-09-09 PROCEDURE — 250N000013 HC RX MED GY IP 250 OP 250 PS 637: Performed by: ADVANCED PRACTICE MIDWIFE

## 2021-09-09 RX ADMIN — ACETAMINOPHEN 650 MG: 325 TABLET, FILM COATED ORAL at 02:19

## 2021-09-09 RX ADMIN — IBUPROFEN 800 MG: 800 TABLET ORAL at 09:02

## 2021-09-09 RX ADMIN — IBUPROFEN 800 MG: 800 TABLET ORAL at 02:19

## 2021-09-09 RX ADMIN — DOCUSATE SODIUM 100 MG: 100 CAPSULE, LIQUID FILLED ORAL at 09:01

## 2021-09-09 RX ADMIN — ACETAMINOPHEN 650 MG: 325 TABLET, FILM COATED ORAL at 06:41

## 2021-09-09 NOTE — PLAN OF CARE
Pt caring for self & baby independently. Breastfeeding baby independently. Discharge instructions & medications reviewed, pt states she has no questions or concerns @ this time. Plans to follow-up in 2 & 6 weeks for PP check-ups @ clinic. Discharged to home with baby.

## 2021-09-09 NOTE — PLAN OF CARE
VSS. Assessment WNL. Perineal swelling and soreness.   Pain managed with PO tylenol and IB.   Encouraged sitz baths. Swelling moderate in legs and ankles.   Breastfeeding well with minimal assist for deeper latch.   Bonding well with . SO Abraham present at bedside and supportive.

## 2021-09-09 NOTE — DISCHARGE SUMMARY
[facility] Discharge Summary    Catalina Hampton MRN# [id number]   Age: 21 year old YOB: 2000     Date of Admission:  9/7/2021  Date of Discharge::  9/9/2021  Admitting Physician:  Kyree Dumont CNM  Discharge Physician:  NAOMI Chavarria CNM      Home clinic: [facility]          Admission Diagnoses:   Encounter for triage in pregnant patient [Z36.89]  Labor and delivery indication for care or intervention [O75.9]  Depression, unspecified depression type [F32.9]          Discharge Diagnosis:     Normal spontaneous vaginal delivery  Intrauterine pregnancy at 39+3 weeks gestation  Repair of second degree perineal laceration  Postpartum Anemia: received IV Venofer 300mg x1 dose          Procedures:     Procedure(s): Repair of second degree perineal laceration  IV Venofer 300mg x1 dose       No other significant procedures performed during this admission           Medications Prior to Admission:     Medications Prior to Admission   Medication Sig Dispense Refill Last Dose     Prenatal Vit-Fe Fumarate-FA (PRENATAL MULTIVITAMIN W/IRON) 27-0.8 MG tablet Take 1 tablet by mouth daily   9/6/2021 at Unknown time     [DISCONTINUED] aspirin (ASA) 81 MG chewable tablet Take 81 mg by mouth daily   9/6/2021 at Unknown time     [DISCONTINUED] ferrous sulfate 140 (45 Fe) MG TBCR CR tablet Take by mouth daily   9/6/2021 at Unknown time     [DISCONTINUED] vitamin B complex with vitamin C (VITAMIN  B COMPLEX) tablet Take 1 tablet by mouth daily   9/6/2021 at Unknown time     [DISCONTINUED] vitamin C (ASCORBIC ACID) 1000 MG TABS Take by mouth daily   9/6/2021 at Unknown time     [DISCONTINUED] Vitamin D3 (CHOLECALCIFEROL) 25 mcg (1000 units) tablet Take 1 tablet by mouth daily   9/6/2021 at Unknown time             Discharge Medications:     Current Discharge Medication List      START taking these medications    Details   acetaminophen (TYLENOL) 325 MG tablet Take 2 tablets (650 mg) by mouth every 6  hours as needed for mild pain Start after Delivery.  Qty: 100 tablet, Refills: 0    Associated Diagnoses:  (normal spontaneous vaginal delivery)      ibuprofen (ADVIL/MOTRIN) 600 MG tablet Take 1 tablet (600 mg) by mouth every 6 hours as needed for moderate pain Start after delivery  Qty: 60 tablet, Refills: 0    Associated Diagnoses:  (normal spontaneous vaginal delivery)      senna-docusate (SENOKOT-S/PERICOLACE) 8.6-50 MG tablet Take 1 tablet by mouth daily Start after delivery.  Qty: 100 tablet, Refills: 0    Associated Diagnoses:  (normal spontaneous vaginal delivery)         CONTINUE these medications which have NOT CHANGED    Details   Prenatal Vit-Fe Fumarate-FA (PRENATAL MULTIVITAMIN W/IRON) 27-0.8 MG tablet Take 1 tablet by mouth daily         STOP taking these medications       aspirin (ASA) 81 MG chewable tablet Comments:   Reason for Stopping:         ferrous sulfate 140 (45 Fe) MG TBCR CR tablet Comments:   Reason for Stopping:         vitamin B complex with vitamin C (VITAMIN  B COMPLEX) tablet Comments:   Reason for Stopping:         vitamin C (ASCORBIC ACID) 1000 MG TABS Comments:   Reason for Stopping:         Vitamin D3 (CHOLECALCIFEROL) 25 mcg (1000 units) tablet Comments:   Reason for Stopping:                     Consultations:   No consultations were requested during this admission          Brief History of Labor:     Catalina Hampton MRN# [id number]   Age: 21 year old YOB: 2000      Delivery Note     Labor Course: Catalina Hampton is a 21 year old  @ 39w3d admitted to  after SROM of clear fluid at 0400. Arrived to , ROM plus positive. SVE on admission /-1. Labored unmedicated using birthing ball, ambulation, position changes. Progressed to 5/-1 at 1243. Pt requested epidural for pain management. Experienced relief and took a nap. Started to feel some mild pressure. SVE 10/100/0 at 1700.      Delivery Course:  Pt became complete at 1700. Pt desired to  "labor down. Pushed with great effort in semi fowlers and right side lying.  Delivered a vigorous baby girl at 1943 who was immediately placed on mom's abdomen. No nuchal noted. IV pitocin started after delivery of infant per protocol. Umbilical cord was double clamped and cut by FOB after the cord stopped pulsing. No indication for cord gases. Cord blood obtained. Baby had small meconium bowel movement and voided while on mom's abdomen. Placenta spontaneously delivered intact at 194 without difficulty via Schultze mechanism. Inspection of vagina and perineum revealed a shallow 2nd degree laceration and bilateral periurethral abrasions. 2nd degree laceration was repaired in the usual fashion with 3-0 vicryl. One interrupted suture with a 4-0 vicryl placed in left periurethral abrasion. Right abrasion was hemostatic and not requiring repair. 1% lidocaine was infiltrated before the repair. St cath performed. Large clots expelled with fundal massage; continued trickling noted. Manual sweep of lower uterine segment performed. Fundus now firm and midline, scant bleeding. 2gm IV ancef ordered.  Mom and baby are stable.      IUP at 39+3 weeks gestation delivered on 2021.     delivery of a viable Female infant.  Weight : 7 pounds 8 ounces   Apgars of 7 at 1 minute and 9 at 5 minutes.  Labor was spontaneous.  Medications administered  in labor:  Pain Rx Epidural; Antibiotics No; Other n/a  Perineum: shallow 2nd degree  Placenta-mechanism: spontaneous, intact,  with a 3 vessel cord. IV oxytocin was given.  QBL was 563ml.  Anticipated Discharge Date: 21  Complications of pregnancy, labor and delivery: none  Birth attendants:Kyree Dumont CNM, MARCO SALGADO       Assessment Day of Discharge    Vital signs:  Temp: 98.1  F (36.7  C) Temp src: Oral BP: 125/74 Pulse: 71   Resp: 16            Estimated body mass index is 20.6 kg/m  as calculated from the following:    Height as of 18: 1.626 m (5' 4\").    " Weight as of 18: 54.4 kg (120 lb).      Breasts: Soft, filling  Nipples: Intact, Non-tender  Abdomen: Soft, Non-tender    Uterus: Fundus Firm, Non-tender, located 1fb below the umbilicus   Lochia: Rubra, appropriate amount    Perineum:  Well-approximated, healing well  Lower Extremities: trace Edema Bilateral, Negative Becky's Sign           Hospital Course:   The patient's hospital course was unremarkable.  On discharge, pain was well controlled with prn tylenol and ibuprofen. Vaginal bleeding is similar to peak menstrual flow and decreasing.  Voiding without difficulty.  Ambulating well and tolerating a normal diet.  No fever.  Breastfeeding well.  Infant is stable.  Has had bowel movement. Mood stable, has family supports identified.   Catalina Hampton was discharged on post-partum day #2.    Post-partum hemoglobin:   Hemoglobin   Date Value Ref Range Status   2021 8.7 (L) 11.7 - 15.7 g/dL Final   2017 12.6 11.7 - 15.7 g/dL Final        Rh:positive, Rhogam not indicted  Rubella status:immune  Plan for contraception: HANK, pill or patch. To decide at 6wk pp visit. Aware of return of fertility. Advised pelvic rest x6 weeks. Patient stated understanding.     Reviewed Chapter One of  [facility] including warning signs of postpartum, activity level, avoiding IC for 6 weeks, Tub soaks BID, Kegels, abdominal exercises, breast care,  postpartum depression/anxiety.  Reviewed how to establish/maintain milk supply, nipple care, pumping for storage, fore/hind milk, wet/dirty diapers to expect each day, resources prn if questions or concerns.  Pt verbalized understanding with teach back.          Discharge Instructions and Follow-Up:     Discharge diet: Regular, Iron Rich, High Fiber, Minimum 80oz water daily   Discharge activity: Pelvic rest: abstain from intercourse and do not use tampons for 6 week(s)   Discharge follow-up: Follow up with [facility] in 2 weeks  Follow up with [facility] in 6 weeks   Wound  care: Drink plenty of fluids  Ice to area for comfort   Keep wound clean and dry   Avoid constipation   Sitz bath TID            Discharge Disposition:     Discharged to home           NAOMI ChavarriaM

## 2021-09-10 ENCOUNTER — PATIENT OUTREACH (OUTPATIENT)
Dept: CARE COORDINATION | Facility: CLINIC | Age: 21
End: 2021-09-10

## 2021-09-10 DIAGNOSIS — Z71.89 OTHER SPECIFIED COUNSELING: ICD-10-CM

## 2021-09-11 NOTE — PROGRESS NOTES
Clinic Care Coordination Contact  [facility]/Voicemail       Clinical Data: Care Coordinator Outreach    Outreach attempted x 2.  Left message on patient's voicemail with call back information and requested return call.    Plan:  Care Coordinator will do no further outreaches at this time.    Maki Fountain, [facility]  [phone]  [facility]

## 2021-10-05 ENCOUNTER — DOCUMENTATION ONLY (OUTPATIENT)
Dept: OTHER | Facility: CLINIC | Age: 21
End: 2021-10-05

## 2021-10-09 ENCOUNTER — HEALTH MAINTENANCE LETTER (OUTPATIENT)
Age: 21
End: 2021-10-09

## 2022-07-16 ENCOUNTER — HEALTH MAINTENANCE LETTER (OUTPATIENT)
Age: 22
End: 2022-07-16

## 2022-09-17 ENCOUNTER — HEALTH MAINTENANCE LETTER (OUTPATIENT)
Age: 22
End: 2022-09-17

## 2023-07-29 ENCOUNTER — HEALTH MAINTENANCE LETTER (OUTPATIENT)
Age: 23
End: 2023-07-29